# Patient Record
Sex: MALE | Race: WHITE | Employment: OTHER | URBAN - METROPOLITAN AREA
[De-identification: names, ages, dates, MRNs, and addresses within clinical notes are randomized per-mention and may not be internally consistent; named-entity substitution may affect disease eponyms.]

---

## 2019-01-21 ENCOUNTER — HOSPITAL ENCOUNTER (OUTPATIENT)
Age: 74
Discharge: HOME | End: 2019-01-23
Payer: MEDICARE

## 2019-01-21 PROBLEM — E78.2 MIXED HYPERLIPIDEMIA: Status: ACTIVE | Noted: 2019-01-21

## 2019-01-21 PROBLEM — Z79.4 TYPE 2 DIABETES MELLITUS WITH OPHTHALMIC COMPLICATION, WITH LONG-TERM CURRENT USE OF INSULIN (HCC): Status: ACTIVE | Noted: 2019-01-21

## 2019-01-21 PROBLEM — N40.0 BENIGN PROSTATIC HYPERPLASIA WITHOUT LOWER URINARY TRACT SYMPTOMS: Status: ACTIVE | Noted: 2019-01-21

## 2019-01-21 PROBLEM — I10 BENIGN ESSENTIAL HTN: Status: ACTIVE | Noted: 2019-01-21

## 2019-01-21 PROBLEM — E11.39 TYPE 2 DIABETES MELLITUS WITH OPHTHALMIC COMPLICATION, WITH LONG-TERM CURRENT USE OF INSULIN (HCC): Status: ACTIVE | Noted: 2019-01-21

## 2019-01-21 LAB
ALBUMIN SERPL-MCNC: 4.3 G/DL (ref 3.5–5.2)
ALP BLD-CCNC: 46 U/L (ref 40–129)
ALT SERPL-CCNC: 23 U/L (ref 0–40)
ANION GAP SERPL CALCULATED.3IONS-SCNC: 14 MMOL/L (ref 7–16)
AST SERPL-CCNC: 18 U/L (ref 0–39)
BASOPHILS ABSOLUTE: 0.03 E9/L (ref 0–0.2)
BASOPHILS RELATIVE PERCENT: 0.5 % (ref 0–2)
BILIRUB SERPL-MCNC: 0.6 MG/DL (ref 0–1.2)
BUN BLDV-MCNC: 19 MG/DL (ref 8–23)
CALCIUM SERPL-MCNC: 8.9 MG/DL (ref 8.6–10.2)
CHLORIDE BLD-SCNC: 100 MMOL/L (ref 98–107)
CHOLESTEROL, TOTAL: 133 MG/DL (ref 0–199)
CO2: 22 MMOL/L (ref 22–29)
CREAT SERPL-MCNC: 0.9 MG/DL (ref 0.7–1.2)
EOSINOPHILS ABSOLUTE: 0.15 E9/L (ref 0.05–0.5)
EOSINOPHILS RELATIVE PERCENT: 2.4 % (ref 0–6)
GFR AFRICAN AMERICAN: >60
GFR NON-AFRICAN AMERICAN: >60 ML/MIN/1.73
GLUCOSE BLD-MCNC: 234 MG/DL (ref 74–99)
HBA1C MFR BLD: 7.7 % (ref 4–5.6)
HCT VFR BLD CALC: 46.5 % (ref 37–54)
HDLC SERPL-MCNC: 46 MG/DL
HEMOGLOBIN: 15.1 G/DL (ref 12.5–16.5)
IMMATURE GRANULOCYTES #: 0.02 E9/L
IMMATURE GRANULOCYTES %: 0.3 % (ref 0–5)
LDL CHOLESTEROL CALCULATED: 63 MG/DL (ref 0–99)
LYMPHOCYTES ABSOLUTE: 1.78 E9/L (ref 1.5–4)
LYMPHOCYTES RELATIVE PERCENT: 29 % (ref 20–42)
MCH RBC QN AUTO: 30.6 PG (ref 26–35)
MCHC RBC AUTO-ENTMCNC: 32.5 % (ref 32–34.5)
MCV RBC AUTO: 94.1 FL (ref 80–99.9)
MONOCYTES ABSOLUTE: 0.34 E9/L (ref 0.1–0.95)
MONOCYTES RELATIVE PERCENT: 5.5 % (ref 2–12)
NEUTROPHILS ABSOLUTE: 3.82 E9/L (ref 1.8–7.3)
NEUTROPHILS RELATIVE PERCENT: 62.3 % (ref 43–80)
PDW BLD-RTO: 13.1 FL (ref 11.5–15)
PLATELET # BLD: 172 E9/L (ref 130–450)
PMV BLD AUTO: 11.3 FL (ref 7–12)
POTASSIUM SERPL-SCNC: 4.7 MMOL/L (ref 3.5–5)
RBC # BLD: 4.94 E12/L (ref 3.8–5.8)
SODIUM BLD-SCNC: 136 MMOL/L (ref 132–146)
TOTAL PROTEIN: 7.1 G/DL (ref 6.4–8.3)
TRIGL SERPL-MCNC: 119 MG/DL (ref 0–149)
VLDLC SERPL CALC-MCNC: 24 MG/DL
WBC # BLD: 6.1 E9/L (ref 4.5–11.5)

## 2019-01-21 PROCEDURE — 85025 COMPLETE CBC W/AUTO DIFF WBC: CPT

## 2019-01-21 PROCEDURE — 83036 HEMOGLOBIN GLYCOSYLATED A1C: CPT

## 2019-01-21 PROCEDURE — 82570 ASSAY OF URINE CREATININE: CPT

## 2019-01-21 PROCEDURE — 82044 UR ALBUMIN SEMIQUANTITATIVE: CPT

## 2019-01-21 PROCEDURE — 80061 LIPID PANEL: CPT

## 2019-01-21 PROCEDURE — 80053 COMPREHEN METABOLIC PANEL: CPT

## 2019-01-22 LAB
CREATININE URINE: 70 MG/DL (ref 40–278)
MICROALBUMIN UR-MCNC: 277.2 MG/L
MICROALBUMIN/CREAT UR-RTO: 396 (ref 0–30)

## 2019-09-23 ENCOUNTER — HOSPITAL ENCOUNTER (OUTPATIENT)
Age: 74
Discharge: HOME | End: 2019-09-25
Payer: MEDICARE

## 2019-09-23 DIAGNOSIS — Z79.4 TYPE 2 DIABETES MELLITUS WITH RETINOPATHY WITHOUT MACULAR EDEMA, WITH LONG-TERM CURRENT USE OF INSULIN, UNSPECIFIED LATERALITY, UNSPECIFIED RETINOPATHY SEVERITY (HCC): ICD-10-CM

## 2019-09-23 DIAGNOSIS — E11.319 TYPE 2 DIABETES MELLITUS WITH RETINOPATHY WITHOUT MACULAR EDEMA, WITH LONG-TERM CURRENT USE OF INSULIN, UNSPECIFIED LATERALITY, UNSPECIFIED RETINOPATHY SEVERITY (HCC): ICD-10-CM

## 2019-09-23 LAB
ALBUMIN SERPL-MCNC: 4.4 G/DL (ref 3.5–5.2)
ALP BLD-CCNC: 46 U/L (ref 40–129)
ALT SERPL-CCNC: 20 U/L (ref 0–40)
ANION GAP SERPL CALCULATED.3IONS-SCNC: 17 MMOL/L (ref 7–16)
AST SERPL-CCNC: 20 U/L (ref 0–39)
BASOPHILS ABSOLUTE: 0.03 E9/L (ref 0–0.2)
BASOPHILS RELATIVE PERCENT: 0.5 % (ref 0–2)
BILIRUB SERPL-MCNC: 0.9 MG/DL (ref 0–1.2)
BUN BLDV-MCNC: 22 MG/DL (ref 8–23)
CALCIUM SERPL-MCNC: 9.5 MG/DL (ref 8.6–10.2)
CHLORIDE BLD-SCNC: 104 MMOL/L (ref 98–107)
CHOLESTEROL, TOTAL: 141 MG/DL (ref 0–199)
CO2: 22 MMOL/L (ref 22–29)
CREAT SERPL-MCNC: 1 MG/DL (ref 0.7–1.2)
CREATININE URINE: 145 MG/DL (ref 40–278)
EOSINOPHILS ABSOLUTE: 0.07 E9/L (ref 0.05–0.5)
EOSINOPHILS RELATIVE PERCENT: 1.1 % (ref 0–6)
GFR AFRICAN AMERICAN: >60
GFR NON-AFRICAN AMERICAN: >60 ML/MIN/1.73
GLUCOSE BLD-MCNC: 160 MG/DL (ref 74–99)
HBA1C MFR BLD: 7.8 % (ref 4–5.6)
HCT VFR BLD CALC: 47.1 % (ref 37–54)
HDLC SERPL-MCNC: 53 MG/DL
HEMOGLOBIN: 15.1 G/DL (ref 12.5–16.5)
IMMATURE GRANULOCYTES #: 0.01 E9/L
IMMATURE GRANULOCYTES %: 0.2 % (ref 0–5)
LDL CHOLESTEROL CALCULATED: 71 MG/DL (ref 0–99)
LYMPHOCYTES ABSOLUTE: 1.9 E9/L (ref 1.5–4)
LYMPHOCYTES RELATIVE PERCENT: 28.6 % (ref 20–42)
MCH RBC QN AUTO: 31.2 PG (ref 26–35)
MCHC RBC AUTO-ENTMCNC: 32.1 % (ref 32–34.5)
MCV RBC AUTO: 97.3 FL (ref 80–99.9)
MICROALBUMIN UR-MCNC: 504 MG/L
MICROALBUMIN/CREAT UR-RTO: 347.6 (ref 0–30)
MONOCYTES ABSOLUTE: 0.37 E9/L (ref 0.1–0.95)
MONOCYTES RELATIVE PERCENT: 5.6 % (ref 2–12)
NEUTROPHILS ABSOLUTE: 4.27 E9/L (ref 1.8–7.3)
NEUTROPHILS RELATIVE PERCENT: 64 % (ref 43–80)
PDW BLD-RTO: 13.5 FL (ref 11.5–15)
PLATELET # BLD: 175 E9/L (ref 130–450)
PMV BLD AUTO: 10.6 FL (ref 7–12)
POTASSIUM SERPL-SCNC: 4.6 MMOL/L (ref 3.5–5)
RBC # BLD: 4.84 E12/L (ref 3.8–5.8)
SODIUM BLD-SCNC: 143 MMOL/L (ref 132–146)
TOTAL PROTEIN: 7.4 G/DL (ref 6.4–8.3)
TRIGL SERPL-MCNC: 86 MG/DL (ref 0–149)
VLDLC SERPL CALC-MCNC: 17 MG/DL
WBC # BLD: 6.7 E9/L (ref 4.5–11.5)

## 2019-09-23 PROCEDURE — 80061 LIPID PANEL: CPT

## 2019-09-23 PROCEDURE — 83036 HEMOGLOBIN GLYCOSYLATED A1C: CPT

## 2019-09-23 PROCEDURE — 80053 COMPREHEN METABOLIC PANEL: CPT

## 2019-09-23 PROCEDURE — 82044 UR ALBUMIN SEMIQUANTITATIVE: CPT

## 2019-09-23 PROCEDURE — 82570 ASSAY OF URINE CREATININE: CPT

## 2019-09-23 PROCEDURE — 85025 COMPLETE CBC W/AUTO DIFF WBC: CPT

## 2020-03-25 ENCOUNTER — HOSPITAL ENCOUNTER (OUTPATIENT)
Age: 75
Discharge: HOME | End: 2020-03-27
Payer: MEDICARE

## 2020-03-25 PROBLEM — N50.82 SCROTAL PAIN: Status: ACTIVE | Noted: 2020-03-25

## 2020-03-25 LAB
ALBUMIN SERPL-MCNC: 4.2 G/DL (ref 3.5–5.2)
ALP BLD-CCNC: 47 U/L (ref 40–129)
ALT SERPL-CCNC: 17 U/L (ref 0–40)
ANION GAP SERPL CALCULATED.3IONS-SCNC: 13 MMOL/L (ref 7–16)
AST SERPL-CCNC: 20 U/L (ref 0–39)
BASOPHILS ABSOLUTE: 0.04 E9/L (ref 0–0.2)
BASOPHILS RELATIVE PERCENT: 0.4 % (ref 0–2)
BILIRUB SERPL-MCNC: 0.5 MG/DL (ref 0–1.2)
BUN BLDV-MCNC: 23 MG/DL (ref 8–23)
CALCIUM SERPL-MCNC: 9.6 MG/DL (ref 8.6–10.2)
CHLORIDE BLD-SCNC: 100 MMOL/L (ref 98–107)
CHOLESTEROL, TOTAL: 137 MG/DL (ref 0–199)
CO2: 25 MMOL/L (ref 22–29)
CREAT SERPL-MCNC: 1.1 MG/DL (ref 0.7–1.2)
CREATININE URINE: 77 MG/DL (ref 40–278)
EOSINOPHILS ABSOLUTE: 0.83 E9/L (ref 0.05–0.5)
EOSINOPHILS RELATIVE PERCENT: 7.6 % (ref 0–6)
GFR AFRICAN AMERICAN: >60
GFR NON-AFRICAN AMERICAN: >60 ML/MIN/1.73
GLUCOSE BLD-MCNC: 158 MG/DL (ref 74–99)
HBA1C MFR BLD: 8.2 % (ref 4–5.6)
HCT VFR BLD CALC: 48.4 % (ref 37–54)
HDLC SERPL-MCNC: 39 MG/DL
HEMOGLOBIN: 15.2 G/DL (ref 12.5–16.5)
IMMATURE GRANULOCYTES #: 0.04 E9/L
IMMATURE GRANULOCYTES %: 0.4 % (ref 0–5)
LDL CHOLESTEROL CALCULATED: 59 MG/DL (ref 0–99)
LYMPHOCYTES ABSOLUTE: 2.26 E9/L (ref 1.5–4)
LYMPHOCYTES RELATIVE PERCENT: 20.7 % (ref 20–42)
MCH RBC QN AUTO: 30.2 PG (ref 26–35)
MCHC RBC AUTO-ENTMCNC: 31.4 % (ref 32–34.5)
MCV RBC AUTO: 96.2 FL (ref 80–99.9)
MICROALBUMIN UR-MCNC: 257.9 MG/L
MICROALBUMIN/CREAT UR-RTO: 334.9 (ref 0–30)
MONOCYTES ABSOLUTE: 0.7 E9/L (ref 0.1–0.95)
MONOCYTES RELATIVE PERCENT: 6.4 % (ref 2–12)
NEUTROPHILS ABSOLUTE: 7.04 E9/L (ref 1.8–7.3)
NEUTROPHILS RELATIVE PERCENT: 64.5 % (ref 43–80)
PDW BLD-RTO: 13.5 FL (ref 11.5–15)
PLATELET # BLD: 176 E9/L (ref 130–450)
PMV BLD AUTO: 10.7 FL (ref 7–12)
POTASSIUM SERPL-SCNC: 5.2 MMOL/L (ref 3.5–5)
RBC # BLD: 5.03 E12/L (ref 3.8–5.8)
SODIUM BLD-SCNC: 138 MMOL/L (ref 132–146)
TOTAL PROTEIN: 7.2 G/DL (ref 6.4–8.3)
TRIGL SERPL-MCNC: 194 MG/DL (ref 0–149)
VLDLC SERPL CALC-MCNC: 39 MG/DL
WBC # BLD: 10.9 E9/L (ref 4.5–11.5)

## 2020-03-25 PROCEDURE — 80053 COMPREHEN METABOLIC PANEL: CPT

## 2020-03-25 PROCEDURE — 82044 UR ALBUMIN SEMIQUANTITATIVE: CPT

## 2020-03-25 PROCEDURE — 80061 LIPID PANEL: CPT

## 2020-03-25 PROCEDURE — 85025 COMPLETE CBC W/AUTO DIFF WBC: CPT

## 2020-03-25 PROCEDURE — 83036 HEMOGLOBIN GLYCOSYLATED A1C: CPT

## 2020-03-25 PROCEDURE — G0103 PSA SCREENING: HCPCS

## 2020-03-25 PROCEDURE — 82570 ASSAY OF URINE CREATININE: CPT

## 2020-03-27 LAB — PROSTATE SPECIFIC ANTIGEN: 2.18 NG/ML (ref 0–4)

## 2020-07-08 ENCOUNTER — APPOINTMENT (OUTPATIENT)
Dept: CT IMAGING | Age: 75
End: 2020-07-08
Payer: MEDICARE

## 2020-07-08 ENCOUNTER — HOSPITAL ENCOUNTER (EMERGENCY)
Age: 75
Discharge: HOME | End: 2020-07-08
Attending: EMERGENCY MEDICINE
Payer: MEDICARE

## 2020-07-08 VITALS
DIASTOLIC BLOOD PRESSURE: 86 MMHG | HEIGHT: 71 IN | TEMPERATURE: 97.8 F | WEIGHT: 250 LBS | OXYGEN SATURATION: 98 % | RESPIRATION RATE: 18 BRPM | HEART RATE: 55 BPM | BODY MASS INDEX: 35 KG/M2 | SYSTOLIC BLOOD PRESSURE: 182 MMHG

## 2020-07-08 LAB
ALBUMIN SERPL-MCNC: 4.5 G/DL (ref 3.5–5.2)
ALP BLD-CCNC: 47 U/L (ref 40–129)
ALT SERPL-CCNC: 16 U/L (ref 0–40)
ANION GAP SERPL CALCULATED.3IONS-SCNC: 12 MMOL/L (ref 7–16)
AST SERPL-CCNC: 15 U/L (ref 0–39)
BACTERIA: ABNORMAL /HPF
BASOPHILS ABSOLUTE: 0.03 E9/L (ref 0–0.2)
BASOPHILS RELATIVE PERCENT: 0.4 % (ref 0–2)
BILIRUB SERPL-MCNC: 0.5 MG/DL (ref 0–1.2)
BILIRUBIN URINE: NEGATIVE
BLOOD, URINE: ABNORMAL
BUN BLDV-MCNC: 24 MG/DL (ref 8–23)
CALCIUM SERPL-MCNC: 9.6 MG/DL (ref 8.6–10.2)
CHLORIDE BLD-SCNC: 103 MMOL/L (ref 98–107)
CLARITY: CLEAR
CO2: 24 MMOL/L (ref 22–29)
COLOR: YELLOW
CREAT SERPL-MCNC: 1.1 MG/DL (ref 0.7–1.2)
EOSINOPHILS ABSOLUTE: 0.3 E9/L (ref 0.05–0.5)
EOSINOPHILS RELATIVE PERCENT: 4.1 % (ref 0–6)
GFR AFRICAN AMERICAN: >60
GFR NON-AFRICAN AMERICAN: >60 ML/MIN/1.73
GLUCOSE BLD-MCNC: 155 MG/DL (ref 74–99)
GLUCOSE URINE: 250 MG/DL
HCT VFR BLD CALC: 45.4 % (ref 37–54)
HEMOGLOBIN: 15.2 G/DL (ref 12.5–16.5)
IMMATURE GRANULOCYTES #: 0.05 E9/L
IMMATURE GRANULOCYTES %: 0.7 % (ref 0–5)
KETONES, URINE: NEGATIVE MG/DL
LACTIC ACID: 1.6 MMOL/L (ref 0.5–2.2)
LEUKOCYTE ESTERASE, URINE: NEGATIVE
LYMPHOCYTES ABSOLUTE: 2.13 E9/L (ref 1.5–4)
LYMPHOCYTES RELATIVE PERCENT: 28.9 % (ref 20–42)
MAGNESIUM: 2 MG/DL (ref 1.6–2.6)
MCH RBC QN AUTO: 31.1 PG (ref 26–35)
MCHC RBC AUTO-ENTMCNC: 33.5 % (ref 32–34.5)
MCV RBC AUTO: 92.8 FL (ref 80–99.9)
MONOCYTES ABSOLUTE: 0.51 E9/L (ref 0.1–0.95)
MONOCYTES RELATIVE PERCENT: 6.9 % (ref 2–12)
NEUTROPHILS ABSOLUTE: 4.35 E9/L (ref 1.8–7.3)
NEUTROPHILS RELATIVE PERCENT: 59 % (ref 43–80)
NITRITE, URINE: NEGATIVE
PDW BLD-RTO: 13 FL (ref 11.5–15)
PH UA: 5.5 (ref 5–9)
PLATELET # BLD: 163 E9/L (ref 130–450)
PMV BLD AUTO: 10.4 FL (ref 7–12)
POTASSIUM SERPL-SCNC: 4.4 MMOL/L (ref 3.5–5)
PROTEIN UA: 100 MG/DL
RBC # BLD: 4.89 E12/L (ref 3.8–5.8)
RBC UA: ABNORMAL /HPF (ref 0–2)
SODIUM BLD-SCNC: 139 MMOL/L (ref 132–146)
SPECIFIC GRAVITY UA: >=1.03 (ref 1–1.03)
TOTAL CK: 164 U/L (ref 20–200)
TOTAL PROTEIN: 7.2 G/DL (ref 6.4–8.3)
TROPONIN: <0.01 NG/ML (ref 0–0.03)
UROBILINOGEN, URINE: 0.2 E.U./DL
WBC # BLD: 7.4 E9/L (ref 4.5–11.5)
WBC UA: ABNORMAL /HPF (ref 0–5)

## 2020-07-08 PROCEDURE — 71045 X-RAY EXAM CHEST 1 VIEW: CPT

## 2020-07-08 PROCEDURE — 85025 COMPLETE CBC W/AUTO DIFF WBC: CPT

## 2020-07-08 PROCEDURE — 84484 ASSAY OF TROPONIN QUANT: CPT

## 2020-07-08 PROCEDURE — 83735 ASSAY OF MAGNESIUM: CPT

## 2020-07-08 PROCEDURE — 80053 COMPREHEN METABOLIC PANEL: CPT

## 2020-07-08 PROCEDURE — 70450 CT HEAD/BRAIN W/O DYE: CPT

## 2020-07-08 PROCEDURE — 81001 URINALYSIS AUTO W/SCOPE: CPT

## 2020-07-08 PROCEDURE — 82550 ASSAY OF CK (CPK): CPT

## 2020-07-08 PROCEDURE — 99285 EMERGENCY DEPT VISIT HI MDM: CPT

## 2020-07-08 PROCEDURE — 36415 COLL VENOUS BLD VENIPUNCTURE: CPT

## 2020-07-08 PROCEDURE — 83605 ASSAY OF LACTIC ACID: CPT

## 2020-07-08 PROCEDURE — 93005 ELECTROCARDIOGRAM TRACING: CPT | Performed by: EMERGENCY MEDICINE

## 2020-07-08 RX ORDER — TADALAFIL 20 MG/1
20 TABLET ORAL EVERY OTHER DAY
COMMUNITY
End: 2021-10-27

## 2020-07-08 RX ORDER — NYSTATIN 100000 U/G
CREAM TOPICAL 2 TIMES DAILY
COMMUNITY
End: 2021-04-30 | Stop reason: SDUPTHER

## 2020-07-08 ASSESSMENT — VISUAL ACUITY
OS: 20/50
OD: 20/30
OU: 20/40

## 2020-07-08 NOTE — ED PROVIDER NOTES
HPI:  7/8/20, Time: 6:38 PM EDT         Jackie Morfin is a 75 y.o. male presenting to the ED for binocular diplopia, beginning 1 week ago. States it goes away if covers either eye. He denies head injury, headache, nausea, emesis, slurred speech, color vision change, focal paresthesias, focal weakness, difficulty breathing or swallowing. States the double vision is [id number]. Called his ophthalmologist and made an appointment but it is not until next week and was advised to come to the ER. The complaint has been constant, moderate in severity, and worsened by nothing. Denies medication changes. Patient denies fever/chills, cough, congestion, chest pain, shortness of breath, edema, headache, focal paresthesias, focal weakness, abdominal pain, nausea, vomiting, diarrhea, constipation, dysuria, hematuria, trauma, neck or back pain or other complaints. ROS:   Pertinent positives and negatives are stated within HPI, all other systems reviewed and are negative.      --------------------------------------------- PAST HISTORY ---------------------------------------------  Past Medical History:  has a past medical history of Diabetes mellitus ([facility] [address] Dizziness, Dizziness, Hypertension, Hypertension, Inner ear dysfunction, Prostate enlargement, and Syncope, near. Past Surgical History:  has a past surgical history that includes Tonsillectomy; eye surgery; and Colonoscopy (08/06/2010). Social History:  reports that he quit smoking about 5 years ago. His smoking use included pipe. He started smoking about 36 years ago. He has a 30.00 pack-year smoking history. He has never used smokeless tobacco. He reports that he does not drink alcohol or use drugs. Family History: family history includes Cancer in his brother; High Blood Pressure in his father. The patients home medications have been reviewed.     Allergies: Patient has no known allergies. ---------------------------------------------------PHYSICAL EXAM--------------------------------------    Constitutional:  Well developed, well nourished, no acute distress, non-toxic appearance   Eyes:  PERRLA, conjunctiva normal, left EOMI, right eye does not track laterally past midline with eliciting his dipolopia, no nystagmus. Prefers to tilt head when looking at you and likes to closed his left eye. HENT:  Atraumatic, external ears normal, nose normal, oropharynx moist. Neck- normal range of motion, no nuchal rigidity  Respiratory:  No respiratory distress, normal breath sounds, no rales, no wheezing   Cardiovascular:  Normal rate, normal rhythm, no murmurs, no gallops, no rubs. Radial and DP pulses 2+ bilaterally. GI:  Soft, nondistended, normal bowel sounds, nontender, no organomegaly, no mass, no rebound, no guarding   :  No costovertebral angle tenderness   Musculoskeletal:  2+ bilateral lower extremity edema with venous stasis, no tenderness, no deformities. Back- no tenderness  Integument:  Well hydrated, no rash. Adequate perfusion. Lymphatic:  No cervical lymphadenopathy noted   Neurologic:  Alert & oriented x 3, CN 2-12 normal, normal motor function, normal sensory function, no focal deficits noted. No slurred speech. Diplopia resolved with covering a single eye. Psychiatric:  Speech and behavior appropriate     NIH Stroke Scale/Score at time of initial evaluation:  1A: Level of Consciousness 0 - alert; keenly responsive   1B: Ask Month and Age 0 - answers both questions correctly   1C:  Tell Patient To Open and Close Eyes, then Hand  Squeeze 0 - performs both tasks correctly   2: Test Horizontal Extraocular Movements 0 - normal   3: Test Visual Fields 0 - no visual loss   4: Test Facial Palsy 0 - normal symmetric movement   5A: Test Left Arm Motor Drift 0 - no drift, limb holds 90 (or 45) degrees for full 10 seconds   5B: Test Right Arm Motor Drift 0 - no drift, limb Neutrophils % 59.0 43.0 - 80.0 %    Immature Granulocytes % 0.7 0.0 - 5.0 %    Lymphocytes % 28.9 20.0 - 42.0 %    Monocytes % 6.9 2.0 - 12.0 %    Eosinophils % 4.1 0.0 - 6.0 %    Basophils % 0.4 0.0 - 2.0 %    Neutrophils Absolute 4.35 1.80 - 7.30 E9/L    Immature Granulocytes # 0.05 E9/L    Lymphocytes Absolute 2.13 1.50 - 4.00 E9/L    Monocytes Absolute 0.51 0.10 - 0.95 E9/L    Eosinophils Absolute 0.30 0.05 - 0.50 E9/L    Basophils Absolute 0.03 0.00 - 0.20 E9/L   Comprehensive Metabolic Panel   Result Value Ref Range    Sodium 139 132 - 146 mmol/L    Potassium 4.4 3.5 - 5.0 mmol/L    Chloride 103 98 - 107 mmol/L    CO2 24 22 - 29 mmol/L    Anion Gap 12 7 - 16 mmol/L    Glucose 155 (H) 74 - 99 mg/dL    BUN 24 (H) 8 - 23 mg/dL    CREATININE 1.1 0.7 - 1.2 mg/dL    GFR Non-African American >60 >=60 mL/min/1.73    GFR African American >60     Calcium 9.6 8.6 - 10.2 mg/dL    Total Protein 7.2 6.4 - 8.3 g/dL    Alb 4.5 3.5 - 5.2 g/dL    Total Bilirubin 0.5 0.0 - 1.2 mg/dL    Alkaline Phosphatase 47 40 - 129 U/L    ALT 16 0 - 40 U/L    AST 15 0 - 39 U/L   Troponin   Result Value Ref Range    Troponin <0.01 0.00 - 0.03 ng/mL   Magnesium   Result Value Ref Range    Magnesium 2.0 1.6 - 2.6 mg/dL   CK   Result Value Ref Range    Total  20 - 200 U/L   Lactic Acid, Plasma   Result Value Ref Range    Lactic Acid 1.6 0.5 - 2.2 mmol/L   EKG 12 Lead   Result Value Ref Range    Ventricular Rate 58 BPM    Atrial Rate 58 BPM    P-R Interval 120 ms    QRS Duration 102 ms    Q-T Interval 428 ms    QTc Calculation (Bazett) 420 ms    P Axis -22 degrees    R Axis -46 degrees    T Axis 99 degrees       RADIOLOGY:  Interpreted by Radiologist.  CT Head WO Contrast   Final Result      NO ACUTE INTRACRANIAL PROCESS         XR CHEST PORTABLE   Final Result      Cardiomegaly      No evidence of airspace consolidation or pulmonary venous congestion.                EKG Interpretation  Interpreted by emergency department physicianNevaeh Time: 1829  Rhythm: sinus bradycardia  Rate: 58  Axis: normal  Conduction: right bundle branch block (incomplete)  ST Segments: no acute change  T Waves: non specific changes  Clinical Impression: non-specific EKG  Comparison to prior EKG: stable as compared to patient's most recent EKG      ------------------------- NURSING NOTES AND VITALS REVIEWED ---------------------------   The nursing notes within the ED encounter and vital signs as below have been reviewed by myself. BP (!) 182/86   Pulse 55   Temp 97.8 °F (36.6 °C) (Temporal)   Resp 18   Ht 5' 11\" (1.803 m)   Wt 250 lb (113.4 kg)   SpO2 98%   BMI 34.87 kg/m²   Oxygen Saturation Interpretation: Normal    The patients available past medical records and past encounters were reviewed. 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 Patient was instructed to return to the ER for any new or worsening symptoms. Additional discharge instructions were given verbally. All questions were answered. Patient is comfortable and agreeable with discharge plan. Patient in no acute distress and non-toxic in appearance. Driving restrictions reviewed   Eye patch provided   Return if worse or develops new symtpoms   Follow-up with PCP on Friday for outpatient MRI as this is very likely due to his diabetes could still be caused by stroke or small brain lesion not evident on today's CAT scan. They understand and agree with the plan.         This patient's ED course included: re-evaluation prior to disposition, cardiac monitoring, continuous pulse oximetry and a personal history and physicial eaxmination    This patient has remained hemodynamically stable and remained unchanged during their ED course. Re-Evaluations:             Time: 8:04 PM EDT  Re-evaluation. Patients symptoms show no change  Repeat physical examination is not changed        Consultations:             8:03 PM EDT  Spoke with Dr Jackelyn Antonio, likely from diabetes. Can manage outpatient. Patch right eye. Spoke with Dr Avelina Ferreira, discussed case, will follow up with patient for MRI Friday. Critical Care: none        Counseling: The emergency provider has spoken with the patient and spouse/SO and discussed todays results, in addition to providing specific details for the plan of care and counseling regarding the diagnosis and prognosis. Questions are answered at this time and they are agreeable with the plan.       --------------------------------- IMPRESSION AND DISPOSITION ---------------------------------    IMPRESSION  1. Right oculomotor nerve palsy    2. Diplopia    3.  Type 2 diabetes mellitus with hyperglycemia, with long-term current use of [facility]        DISPOSITION  Disposition: Discharge to home  Patient condition is stable                  Carl Prakash DO  07/08/20 2053

## 2020-07-09 LAB
EKG ATRIAL RATE: 58 BPM
EKG P AXIS: -22 DEGREES
EKG P-R INTERVAL: 120 MS
EKG Q-T INTERVAL: 428 MS
EKG QRS DURATION: 102 MS
EKG QTC CALCULATION (BAZETT): 420 MS
EKG R AXIS: -46 DEGREES
EKG T AXIS: 99 DEGREES
EKG VENTRICULAR RATE: 58 BPM

## 2020-07-09 PROCEDURE — 93010 ELECTROCARDIOGRAM REPORT: CPT | Performed by: INTERNAL MEDICINE

## 2020-09-25 ENCOUNTER — HOSPITAL ENCOUNTER (OUTPATIENT)
Age: 75
Discharge: HOME | End: 2020-09-27
Payer: MEDICARE

## 2020-09-25 PROBLEM — I10 BENIGN ESSENTIAL HTN: Status: RESOLVED | Noted: 2019-01-21 | Resolved: 2020-09-25

## 2020-09-25 PROBLEM — N50.82 SCROTAL PAIN: Status: RESOLVED | Noted: 2020-03-25 | Resolved: 2020-09-25

## 2020-09-25 LAB
ALBUMIN SERPL-MCNC: 4.3 G/DL (ref 3.5–5.2)
ALP BLD-CCNC: 47 U/L (ref 40–129)
ALT SERPL-CCNC: 17 U/L (ref 0–40)
ANION GAP SERPL CALCULATED.3IONS-SCNC: 15 MMOL/L (ref 7–16)
AST SERPL-CCNC: 16 U/L (ref 0–39)
BASOPHILS ABSOLUTE: 0.03 E9/L (ref 0–0.2)
BASOPHILS RELATIVE PERCENT: 0.4 % (ref 0–2)
BILIRUB SERPL-MCNC: 0.7 MG/DL (ref 0–1.2)
BUN BLDV-MCNC: 20 MG/DL (ref 8–23)
CALCIUM SERPL-MCNC: 9.4 MG/DL (ref 8.6–10.2)
CHLORIDE BLD-SCNC: 101 MMOL/L (ref 98–107)
CHOLESTEROL, TOTAL: 128 MG/DL (ref 0–199)
CO2: 24 MMOL/L (ref 22–29)
CREAT SERPL-MCNC: 1 MG/DL (ref 0.7–1.2)
CREATININE URINE: 58 MG/DL (ref 40–278)
EOSINOPHILS ABSOLUTE: 0.18 E9/L (ref 0.05–0.5)
EOSINOPHILS RELATIVE PERCENT: 2.6 % (ref 0–6)
GFR AFRICAN AMERICAN: >60
GFR NON-AFRICAN AMERICAN: >60 ML/MIN/1.73
GLUCOSE BLD-MCNC: 213 MG/DL (ref 74–99)
HBA1C MFR BLD: 8.3 % (ref 4–5.6)
HCT VFR BLD CALC: 46.6 % (ref 37–54)
HDLC SERPL-MCNC: 43 MG/DL
HEMOGLOBIN: 15.1 G/DL (ref 12.5–16.5)
IMMATURE GRANULOCYTES #: 0.02 E9/L
IMMATURE GRANULOCYTES %: 0.3 % (ref 0–5)
LDL CHOLESTEROL CALCULATED: 55 MG/DL (ref 0–99)
LYMPHOCYTES ABSOLUTE: 1.75 E9/L (ref 1.5–4)
LYMPHOCYTES RELATIVE PERCENT: 24.9 % (ref 20–42)
MCH RBC QN AUTO: 30.5 PG (ref 26–35)
MCHC RBC AUTO-ENTMCNC: 32.4 % (ref 32–34.5)
MCV RBC AUTO: 94.1 FL (ref 80–99.9)
MICROALBUMIN UR-MCNC: 382.4 MG/L
MICROALBUMIN/CREAT UR-RTO: 659.3 (ref 0–30)
MONOCYTES ABSOLUTE: 0.36 E9/L (ref 0.1–0.95)
MONOCYTES RELATIVE PERCENT: 5.1 % (ref 2–12)
NEUTROPHILS ABSOLUTE: 4.69 E9/L (ref 1.8–7.3)
NEUTROPHILS RELATIVE PERCENT: 66.7 % (ref 43–80)
PDW BLD-RTO: 13 FL (ref 11.5–15)
PLATELET # BLD: 173 E9/L (ref 130–450)
PMV BLD AUTO: 11 FL (ref 7–12)
POTASSIUM SERPL-SCNC: 4.7 MMOL/L (ref 3.5–5)
RBC # BLD: 4.95 E12/L (ref 3.8–5.8)
SODIUM BLD-SCNC: 140 MMOL/L (ref 132–146)
TOTAL PROTEIN: 7.2 G/DL (ref 6.4–8.3)
TRIGL SERPL-MCNC: 149 MG/DL (ref 0–149)
VLDLC SERPL CALC-MCNC: 30 MG/DL
WBC # BLD: 7 E9/L (ref 4.5–11.5)

## 2020-09-25 PROCEDURE — 80053 COMPREHEN METABOLIC PANEL: CPT

## 2020-09-25 PROCEDURE — 80061 LIPID PANEL: CPT

## 2020-09-25 PROCEDURE — 82570 ASSAY OF URINE CREATININE: CPT

## 2020-09-25 PROCEDURE — 85025 COMPLETE CBC W/AUTO DIFF WBC: CPT

## 2020-09-25 PROCEDURE — 83036 HEMOGLOBIN GLYCOSYLATED A1C: CPT

## 2020-09-25 PROCEDURE — 82044 UR ALBUMIN SEMIQUANTITATIVE: CPT

## 2021-10-27 DIAGNOSIS — Z79.4 TYPE 2 DIABETES MELLITUS WITH RETINOPATHY WITHOUT MACULAR EDEMA, WITH LONG-TERM CURRENT USE OF INSULIN, UNSPECIFIED LATERALITY, UNSPECIFIED RETINOPATHY SEVERITY (HCC): ICD-10-CM

## 2021-10-27 DIAGNOSIS — E11.319 TYPE 2 DIABETES MELLITUS WITH RETINOPATHY WITHOUT MACULAR EDEMA, WITH LONG-TERM CURRENT USE OF INSULIN, UNSPECIFIED LATERALITY, UNSPECIFIED RETINOPATHY SEVERITY (HCC): ICD-10-CM

## 2021-10-27 LAB
ALBUMIN SERPL-MCNC: 4.5 G/DL (ref 3.5–5.2)
ALP BLD-CCNC: 57 U/L (ref 40–129)
ALT SERPL-CCNC: 22 U/L (ref 0–40)
ANION GAP SERPL CALCULATED.3IONS-SCNC: 13 MMOL/L (ref 7–16)
AST SERPL-CCNC: 23 U/L (ref 0–39)
BACTERIA: NORMAL /HPF
BASOPHILS ABSOLUTE: 0.03 E9/L (ref 0–0.2)
BASOPHILS RELATIVE PERCENT: 0.4 % (ref 0–2)
BILIRUB SERPL-MCNC: 0.4 MG/DL (ref 0–1.2)
BILIRUBIN URINE: NEGATIVE
BLOOD, URINE: NEGATIVE
BUN BLDV-MCNC: 18 MG/DL (ref 6–23)
CALCIUM SERPL-MCNC: 9.5 MG/DL (ref 8.6–10.2)
CHLORIDE BLD-SCNC: 101 MMOL/L (ref 98–107)
CHOLESTEROL, TOTAL: 148 MG/DL (ref 0–199)
CLARITY: CLEAR
CO2: 22 MMOL/L (ref 22–29)
COLOR: YELLOW
CREAT SERPL-MCNC: 1.1 MG/DL (ref 0.7–1.2)
EOSINOPHILS ABSOLUTE: 0.21 E9/L (ref 0.05–0.5)
EOSINOPHILS RELATIVE PERCENT: 2.8 % (ref 0–6)
GFR AFRICAN AMERICAN: >60
GFR NON-AFRICAN AMERICAN: >60 ML/MIN/1.73
GLUCOSE BLD-MCNC: 197 MG/DL (ref 74–99)
GLUCOSE URINE: 100 MG/DL
HBA1C MFR BLD: 8.4 % (ref 4–5.6)
HCT VFR BLD CALC: 46.3 % (ref 37–54)
HDLC SERPL-MCNC: 51 MG/DL
HEMOGLOBIN: 15.2 G/DL (ref 12.5–16.5)
IMMATURE GRANULOCYTES #: 0.02 E9/L
IMMATURE GRANULOCYTES %: 0.3 % (ref 0–5)
KETONES, URINE: NEGATIVE MG/DL
LDL CHOLESTEROL CALCULATED: 72 MG/DL (ref 0–99)
LEUKOCYTE ESTERASE, URINE: NEGATIVE
LYMPHOCYTES ABSOLUTE: 1.86 E9/L (ref 1.5–4)
LYMPHOCYTES RELATIVE PERCENT: 25.1 % (ref 20–42)
MCH RBC QN AUTO: 30.4 PG (ref 26–35)
MCHC RBC AUTO-ENTMCNC: 32.8 % (ref 32–34.5)
MCV RBC AUTO: 92.6 FL (ref 80–99.9)
MICROALBUMIN UR-MCNC: 1066.3 MG/L
MONOCYTES ABSOLUTE: 0.43 E9/L (ref 0.1–0.95)
MONOCYTES RELATIVE PERCENT: 5.8 % (ref 2–12)
NEUTROPHILS ABSOLUTE: 4.85 E9/L (ref 1.8–7.3)
NEUTROPHILS RELATIVE PERCENT: 65.6 % (ref 43–80)
NITRITE, URINE: NEGATIVE
PDW BLD-RTO: 13.3 FL (ref 11.5–15)
PH UA: 5.5 (ref 5–9)
PLATELET # BLD: 185 E9/L (ref 130–450)
PMV BLD AUTO: 11.1 FL (ref 7–12)
POTASSIUM SERPL-SCNC: 4.8 MMOL/L (ref 3.5–5)
PROTEIN UA: 100 MG/DL
RBC # BLD: 5 E12/L (ref 3.8–5.8)
RBC UA: NORMAL /HPF (ref 0–2)
SODIUM BLD-SCNC: 136 MMOL/L (ref 132–146)
SPECIFIC GRAVITY UA: 1.02 (ref 1–1.03)
TOTAL PROTEIN: 6.4 G/DL (ref 6.4–8.3)
TRIGL SERPL-MCNC: 123 MG/DL (ref 0–149)
UROBILINOGEN, URINE: 0.2 E.U./DL
VLDLC SERPL CALC-MCNC: 25 MG/DL
WBC # BLD: 7.4 E9/L (ref 4.5–11.5)
WBC UA: NORMAL /HPF (ref 0–5)

## 2022-02-22 ENCOUNTER — APPOINTMENT (OUTPATIENT)
Dept: ULTRASOUND IMAGING | Age: 77
DRG: 603 | End: 2022-02-22
Payer: MEDICARE

## 2022-02-22 ENCOUNTER — APPOINTMENT (OUTPATIENT)
Dept: GENERAL RADIOLOGY | Age: 77
DRG: 603 | End: 2022-02-22
Payer: MEDICARE

## 2022-02-22 ENCOUNTER — HOSPITAL ENCOUNTER (INPATIENT)
Age: 77
LOS: 7 days | Discharge: HOME | DRG: 603 | End: 2022-03-01
Attending: EMERGENCY MEDICINE | Admitting: INTERNAL MEDICINE
Payer: MEDICARE

## 2022-02-22 DIAGNOSIS — L03.116 LEFT LEG CELLULITIS: Primary | ICD-10-CM

## 2022-02-22 DIAGNOSIS — R73.9 HYPERGLYCEMIA: ICD-10-CM

## 2022-02-22 LAB
ALBUMIN SERPL-MCNC: 3.8 G/DL (ref 3.5–5.2)
ALP BLD-CCNC: 121 U/L (ref 40–129)
ALT SERPL-CCNC: 30 U/L (ref 0–40)
ANION GAP SERPL CALCULATED.3IONS-SCNC: 11 MMOL/L (ref 7–16)
AST SERPL-CCNC: 22 U/L (ref 0–39)
BASOPHILS ABSOLUTE: 0.04 E9/L (ref 0–0.2)
BASOPHILS RELATIVE PERCENT: 0.3 % (ref 0–2)
BILIRUB SERPL-MCNC: 0.4 MG/DL (ref 0–1.2)
BUN BLDV-MCNC: 23 MG/DL (ref 6–23)
CALCIUM SERPL-MCNC: 9.2 MG/DL (ref 8.6–10.2)
CHLORIDE BLD-SCNC: 97 MMOL/L (ref 98–107)
CO2: 26 MMOL/L (ref 22–29)
CREAT SERPL-MCNC: 1.1 MG/DL (ref 0.7–1.2)
EOSINOPHILS ABSOLUTE: 0.01 E9/L (ref 0.05–0.5)
EOSINOPHILS RELATIVE PERCENT: 0.1 % (ref 0–6)
GFR AFRICAN AMERICAN: >60
GFR NON-AFRICAN AMERICAN: >60 ML/MIN/1.73
GLUCOSE BLD-MCNC: 373 MG/DL (ref 74–99)
HCT VFR BLD CALC: 41.6 % (ref 37–54)
HEMOGLOBIN: 13.5 G/DL (ref 12.5–16.5)
IMMATURE GRANULOCYTES #: 0.07 E9/L
IMMATURE GRANULOCYTES %: 0.5 % (ref 0–5)
LACTIC ACID: 1.3 MMOL/L (ref 0.5–2.2)
LYMPHOCYTES ABSOLUTE: 1.53 E9/L (ref 1.5–4)
LYMPHOCYTES RELATIVE PERCENT: 10 % (ref 20–42)
MCH RBC QN AUTO: 30.3 PG (ref 26–35)
MCHC RBC AUTO-ENTMCNC: 32.5 % (ref 32–34.5)
MCV RBC AUTO: 93.3 FL (ref 80–99.9)
MONOCYTES ABSOLUTE: 1.13 E9/L (ref 0.1–0.95)
MONOCYTES RELATIVE PERCENT: 7.4 % (ref 2–12)
NEUTROPHILS ABSOLUTE: 12.46 E9/L (ref 1.8–7.3)
NEUTROPHILS RELATIVE PERCENT: 81.7 % (ref 43–80)
PDW BLD-RTO: 12.3 FL (ref 11.5–15)
PLATELET # BLD: 277 E9/L (ref 130–450)
PMV BLD AUTO: 9.8 FL (ref 7–12)
POTASSIUM SERPL-SCNC: 4.9 MMOL/L (ref 3.5–5)
PRO-BNP: 665 PG/ML (ref 0–450)
RBC # BLD: 4.46 E12/L (ref 3.8–5.8)
SODIUM BLD-SCNC: 134 MMOL/L (ref 132–146)
TOTAL PROTEIN: 7.9 G/DL (ref 6.4–8.3)
TROPONIN, HIGH SENSITIVITY: 35 NG/L (ref 0–11)
WBC # BLD: 15.2 E9/L (ref 4.5–11.5)

## 2022-02-22 PROCEDURE — 6370000000 HC RX 637 (ALT 250 FOR IP): Performed by: NURSE PRACTITIONER

## 2022-02-22 PROCEDURE — 83605 ASSAY OF LACTIC ACID: CPT

## 2022-02-22 PROCEDURE — 36415 COLL VENOUS BLD VENIPUNCTURE: CPT

## 2022-02-22 PROCEDURE — 2580000003 HC RX 258: Performed by: EMERGENCY MEDICINE

## 2022-02-22 PROCEDURE — 84484 ASSAY OF TROPONIN QUANT: CPT

## 2022-02-22 PROCEDURE — 2500000003 HC RX 250 WO HCPCS: Performed by: EMERGENCY MEDICINE

## 2022-02-22 PROCEDURE — 6370000000 HC RX 637 (ALT 250 FOR IP): Performed by: EMERGENCY MEDICINE

## 2022-02-22 PROCEDURE — 71045 X-RAY EXAM CHEST 1 VIEW: CPT

## 2022-02-22 PROCEDURE — 2140000000 HC CCU INTERMEDIATE R&B

## 2022-02-22 PROCEDURE — 6360000002 HC RX W HCPCS: Performed by: EMERGENCY MEDICINE

## 2022-02-22 PROCEDURE — 87150 DNA/RNA AMPLIFIED PROBE: CPT

## 2022-02-22 PROCEDURE — 83880 ASSAY OF NATRIURETIC PEPTIDE: CPT

## 2022-02-22 PROCEDURE — 87077 CULTURE AEROBIC IDENTIFY: CPT

## 2022-02-22 PROCEDURE — 85025 COMPLETE CBC W/AUTO DIFF WBC: CPT

## 2022-02-22 PROCEDURE — 87040 BLOOD CULTURE FOR BACTERIA: CPT

## 2022-02-22 PROCEDURE — 80053 COMPREHEN METABOLIC PANEL: CPT

## 2022-02-22 PROCEDURE — 93971 EXTREMITY STUDY: CPT | Performed by: RADIOLOGY

## 2022-02-22 PROCEDURE — 93971 EXTREMITY STUDY: CPT

## 2022-02-22 PROCEDURE — 99284 EMERGENCY DEPT VISIT MOD MDM: CPT

## 2022-02-22 PROCEDURE — 93005 ELECTROCARDIOGRAM TRACING: CPT | Performed by: NURSE PRACTITIONER

## 2022-02-22 PROCEDURE — 87186 SC STD MICRODIL/AGAR DIL: CPT

## 2022-02-22 PROCEDURE — 96374 THER/PROPH/DIAG INJ IV PUSH: CPT

## 2022-02-22 RX ORDER — HYDROCODONE BITARTRATE AND ACETAMINOPHEN 5; 325 MG/1; MG/1
1 TABLET ORAL ONCE
Status: COMPLETED | OUTPATIENT
Start: 2022-02-22 | End: 2022-02-22

## 2022-02-22 RX ORDER — HYDRALAZINE HYDROCHLORIDE 20 MG/ML
5 INJECTION INTRAMUSCULAR; INTRAVENOUS ONCE
Status: COMPLETED | OUTPATIENT
Start: 2022-02-22 | End: 2022-02-22

## 2022-02-22 RX ORDER — CLINDAMYCIN PHOSPHATE 600 MG/50ML
600 INJECTION INTRAVENOUS ONCE
Status: COMPLETED | OUTPATIENT
Start: 2022-02-22 | End: 2022-02-22

## 2022-02-22 RX ORDER — AMLODIPINE BESYLATE 5 MG/1
5 TABLET ORAL ONCE
Status: COMPLETED | OUTPATIENT
Start: 2022-02-22 | End: 2022-02-22

## 2022-02-22 RX ADMIN — CLINDAMYCIN PHOSPHATE 600 MG: 600 INJECTION, SOLUTION INTRAVENOUS at 22:03

## 2022-02-22 RX ADMIN — CEFAZOLIN SODIUM 1000 MG: 1 INJECTION, POWDER, FOR SOLUTION INTRAMUSCULAR; INTRAVENOUS at 22:43

## 2022-02-22 RX ADMIN — HYDROCODONE BITARTRATE AND ACETAMINOPHEN 1 TABLET: 5; 325 TABLET ORAL at 18:40

## 2022-02-22 RX ADMIN — HYDRALAZINE HYDROCHLORIDE 5 MG: 20 INJECTION INTRAMUSCULAR; INTRAVENOUS at 22:25

## 2022-02-22 RX ADMIN — AMLODIPINE BESYLATE 5 MG: 5 TABLET ORAL at 18:39

## 2022-02-22 RX ADMIN — Medication 4 UNITS: at 22:19

## 2022-02-22 ASSESSMENT — PAIN DESCRIPTION - ORIENTATION: ORIENTATION: LEFT

## 2022-02-22 ASSESSMENT — PAIN DESCRIPTION - DESCRIPTORS: DESCRIPTORS: SORE;TENDER;BURNING

## 2022-02-22 ASSESSMENT — PAIN SCALES - GENERAL: PAINLEVEL_OUTOF10: 10

## 2022-02-22 ASSESSMENT — PAIN DESCRIPTION - LOCATION: LOCATION: LEG

## 2022-02-22 ASSESSMENT — PAIN - FUNCTIONAL ASSESSMENT: PAIN_FUNCTIONAL_ASSESSMENT: 0-10

## 2022-02-22 ASSESSMENT — PAIN DESCRIPTION - PAIN TYPE: TYPE: ACUTE PAIN

## 2022-02-22 NOTE — ED NOTES
Department of Emergency Medicine  FIRST PROVIDER TRIAGE NOTE             Independent MLP           2/22/22  6:37 PM EST    Date of Encounter: 2/22/22   MRN: [id number]      HPI: Josefina Ken is a 67 y.o. male who presents to the ED for Leg Swelling (pt here for swelling and redness to left leg since this morning. pain and warm to touch)  in with 3 day history of Leg pain, swelling and redness. Also wife reports sh had patient hold his BP med bc she thought that was causing his swelling    ROS: Negative for cp or sob. PE: Gen Appearance/Constitutional: alert  Lymphatics: peripheral edema present-   LLE 2 + edema with erythema      Initial Plan of Care: All treatment areas with department are currently occupied. Plan to order/Initiate the following while awaiting opening in ED: labs, EKG, imaging studies and ultrasound.   Initiate Treatment-Testing, Proceed toTreatment Area When Bed Available for ED Attending/MLP to Continue Care    Electronically signed by ABBEY Nicolas CNP   DD: 2/22/22         ABBEY Nicolas CNP  02/22/22 1605

## 2022-02-23 LAB
EKG ATRIAL RATE: 65 BPM
EKG P AXIS: 19 DEGREES
EKG P-R INTERVAL: 176 MS
EKG Q-T INTERVAL: 406 MS
EKG QRS DURATION: 104 MS
EKG QTC CALCULATION (BAZETT): 422 MS
EKG R AXIS: -33 DEGREES
EKG T AXIS: 88 DEGREES
EKG VENTRICULAR RATE: 65 BPM
GLUCOSE BLD-MCNC: 463 MG/DL (ref 74–99)
METER GLUCOSE: 217 MG/DL (ref 74–99)
METER GLUCOSE: 348 MG/DL (ref 74–99)
METER GLUCOSE: 389 MG/DL (ref 74–99)
METER GLUCOSE: 445 MG/DL (ref 74–99)
METER GLUCOSE: >500 MG/DL (ref 74–99)

## 2022-02-23 PROCEDURE — 97530 THERAPEUTIC ACTIVITIES: CPT

## 2022-02-23 PROCEDURE — 2500000003 HC RX 250 WO HCPCS: Performed by: INTERNAL MEDICINE

## 2022-02-23 PROCEDURE — 82947 ASSAY GLUCOSE BLOOD QUANT: CPT

## 2022-02-23 PROCEDURE — 97161 PT EVAL LOW COMPLEX 20 MIN: CPT

## 2022-02-23 PROCEDURE — 93010 ELECTROCARDIOGRAM REPORT: CPT | Performed by: INTERNAL MEDICINE

## 2022-02-23 PROCEDURE — 82962 GLUCOSE BLOOD TEST: CPT

## 2022-02-23 PROCEDURE — 2140000000 HC CCU INTERMEDIATE R&B

## 2022-02-23 PROCEDURE — 6360000002 HC RX W HCPCS: Performed by: INTERNAL MEDICINE

## 2022-02-23 PROCEDURE — 2580000003 HC RX 258: Performed by: INTERNAL MEDICINE

## 2022-02-23 PROCEDURE — 36415 COLL VENOUS BLD VENIPUNCTURE: CPT

## 2022-02-23 PROCEDURE — S5553 INSULIN LONG ACTING 5 U: HCPCS | Performed by: INTERNAL MEDICINE

## 2022-02-23 PROCEDURE — 6370000000 HC RX 637 (ALT 250 FOR IP): Performed by: INTERNAL MEDICINE

## 2022-02-23 RX ORDER — DEXTROSE MONOHYDRATE 50 MG/ML
100 INJECTION, SOLUTION INTRAVENOUS PRN
Status: DISCONTINUED | OUTPATIENT
Start: 2022-02-23 | End: 2022-03-01 | Stop reason: HOSPADM

## 2022-02-23 RX ORDER — FLUCONAZOLE 100 MG/1
200 TABLET ORAL DAILY
Status: DISCONTINUED | OUTPATIENT
Start: 2022-02-23 | End: 2022-02-26

## 2022-02-23 RX ORDER — INSULIN GLARGINE-YFGN 100 [IU]/ML
50 INJECTION, SOLUTION SUBCUTANEOUS DAILY
Status: DISCONTINUED | OUTPATIENT
Start: 2022-02-23 | End: 2022-03-01 | Stop reason: HOSPADM

## 2022-02-23 RX ORDER — AMLODIPINE BESYLATE 5 MG/1
5 TABLET ORAL DAILY
Status: DISCONTINUED | OUTPATIENT
Start: 2022-02-23 | End: 2022-03-01 | Stop reason: HOSPADM

## 2022-02-23 RX ORDER — SODIUM CHLORIDE 0.9 % (FLUSH) 0.9 %
5-40 SYRINGE (ML) INJECTION PRN
Status: DISCONTINUED | OUTPATIENT
Start: 2022-02-23 | End: 2022-03-01 | Stop reason: HOSPADM

## 2022-02-23 RX ORDER — SODIUM CHLORIDE 9 MG/ML
25 INJECTION, SOLUTION INTRAVENOUS PRN
Status: DISCONTINUED | OUTPATIENT
Start: 2022-02-23 | End: 2022-03-01 | Stop reason: HOSPADM

## 2022-02-23 RX ORDER — DEXTROSE MONOHYDRATE 25 G/50ML
12.5 INJECTION, SOLUTION INTRAVENOUS PRN
Status: DISCONTINUED | OUTPATIENT
Start: 2022-02-23 | End: 2022-03-01 | Stop reason: HOSPADM

## 2022-02-23 RX ORDER — NICOTINE POLACRILEX 4 MG
15 LOZENGE BUCCAL PRN
Status: DISCONTINUED | OUTPATIENT
Start: 2022-02-23 | End: 2022-03-01 | Stop reason: HOSPADM

## 2022-02-23 RX ORDER — SODIUM CHLORIDE 0.9 % (FLUSH) 0.9 %
5-40 SYRINGE (ML) INJECTION EVERY 12 HOURS SCHEDULED
Status: DISCONTINUED | OUTPATIENT
Start: 2022-02-23 | End: 2022-03-01 | Stop reason: HOSPADM

## 2022-02-23 RX ORDER — ATORVASTATIN CALCIUM 40 MG/1
40 TABLET, FILM COATED ORAL DAILY
Status: DISCONTINUED | OUTPATIENT
Start: 2022-02-23 | End: 2022-03-01 | Stop reason: HOSPADM

## 2022-02-23 RX ORDER — INSULIN ASPART 100 [IU]/ML
45 INJECTION, SUSPENSION SUBCUTANEOUS 2 TIMES DAILY WITH MEALS
Status: DISCONTINUED | OUTPATIENT
Start: 2022-02-23 | End: 2022-02-23 | Stop reason: CLARIF

## 2022-02-23 RX ORDER — HYDROCODONE BITARTRATE AND ACETAMINOPHEN 5; 325 MG/1; MG/1
1 TABLET ORAL EVERY 6 HOURS PRN
Status: DISCONTINUED | OUTPATIENT
Start: 2022-02-23 | End: 2022-03-01 | Stop reason: HOSPADM

## 2022-02-23 RX ADMIN — INSULIN GLARGINE-YFGN 50 UNITS: 100 INJECTION, SOLUTION SUBCUTANEOUS at 10:53

## 2022-02-23 RX ADMIN — AMLODIPINE BESYLATE 5 MG: 5 TABLET ORAL at 09:21

## 2022-02-23 RX ADMIN — INSULIN LISPRO 10 UNITS: 100 INJECTION, SOLUTION INTRAVENOUS; SUBCUTANEOUS at 16:43

## 2022-02-23 RX ADMIN — Medication 2000 MG: at 10:54

## 2022-02-23 RX ADMIN — Medication 10 ML: at 10:54

## 2022-02-23 RX ADMIN — METFORMIN HYDROCHLORIDE 1000 MG: 500 TABLET, FILM COATED ORAL at 16:41

## 2022-02-23 RX ADMIN — FLUCONAZOLE 200 MG: 100 TABLET ORAL at 10:54

## 2022-02-23 RX ADMIN — Medication 2000 MG: at 19:47

## 2022-02-23 RX ADMIN — HYDROCODONE BITARTRATE AND ACETAMINOPHEN 1 TABLET: 5; 325 TABLET ORAL at 15:53

## 2022-02-23 RX ADMIN — INSULIN LISPRO 9 UNITS: 100 INJECTION, SOLUTION INTRAVENOUS; SUBCUTANEOUS at 16:41

## 2022-02-23 RX ADMIN — ATORVASTATIN CALCIUM 40 MG: 40 TABLET, FILM COATED ORAL at 09:21

## 2022-02-23 RX ADMIN — METFORMIN HYDROCHLORIDE 1000 MG: 500 TABLET, FILM COATED ORAL at 09:21

## 2022-02-23 RX ADMIN — HYDROCODONE BITARTRATE AND ACETAMINOPHEN 1 TABLET: 5; 325 TABLET ORAL at 09:21

## 2022-02-23 RX ADMIN — INSULIN LISPRO 9 UNITS: 100 INJECTION, SOLUTION INTRAVENOUS; SUBCUTANEOUS at 11:10

## 2022-02-23 RX ADMIN — INSULIN LISPRO 2 UNITS: 100 INJECTION, SOLUTION INTRAVENOUS; SUBCUTANEOUS at 21:02

## 2022-02-23 RX ADMIN — Medication 5 ML: at 21:04

## 2022-02-23 ASSESSMENT — PAIN DESCRIPTION - LOCATION
LOCATION: LEG

## 2022-02-23 ASSESSMENT — PAIN DESCRIPTION - FREQUENCY
FREQUENCY: CONTINUOUS

## 2022-02-23 ASSESSMENT — PAIN SCALES - GENERAL
PAINLEVEL_OUTOF10: 3
PAINLEVEL_OUTOF10: 8
PAINLEVEL_OUTOF10: 0
PAINLEVEL_OUTOF10: 0
PAINLEVEL_OUTOF10: 8
PAINLEVEL_OUTOF10: 8

## 2022-02-23 ASSESSMENT — PAIN DESCRIPTION - PROGRESSION: CLINICAL_PROGRESSION: GRADUALLY WORSENING

## 2022-02-23 ASSESSMENT — PAIN DESCRIPTION - ORIENTATION
ORIENTATION: LEFT

## 2022-02-23 ASSESSMENT — PAIN DESCRIPTION - DESCRIPTORS
DESCRIPTORS: BURNING;CONSTANT
DESCRIPTORS: BURNING;CONSTANT

## 2022-02-23 ASSESSMENT — PAIN DESCRIPTION - PAIN TYPE
TYPE: ACUTE PAIN

## 2022-02-23 ASSESSMENT — PAIN - FUNCTIONAL ASSESSMENT: PAIN_FUNCTIONAL_ASSESSMENT: PREVENTS OR INTERFERES SOME ACTIVE ACTIVITIES AND ADLS

## 2022-02-23 ASSESSMENT — PAIN DESCRIPTION - ONSET
ONSET: ON-GOING

## 2022-02-23 NOTE — PROGRESS NOTES
Physical Therapy  Physical Therapy Initial Assessment     Name: Karole Hodgkin  :   MRN: [id number]      Date of Service: 2022    Evaluating PT:  Margarito Fontana PT, DPT    Room #:  7197/1553-K  Diagnosis:  Hyperglycemia [R73.9]  Left leg cellulitis [B93.318]  PMHx/PSHx:  DM, HTN  Procedure/Surgery:  N/A  Precautions:  Fall risk  Equipment Needs:  TBD    SUBJECTIVE:    Pt lives with spouse in a 1 story home with no steps to enter. Bed/bath is on 1st floor. Pt ambulated with no AD PTA. OBJECTIVE:   Initial Evaluation  Date: 22 Treatment Short Term/ Long Term   Goals   AM-PAC 6 Clicks 70/17     Was pt agreeable to Eval/treatment? yes     Does pt have pain? 8/10 LLE     Bed Mobility  Rolling: NT  Supine to sit: NT  Sit to supine: NT  Scooting: SBA  Rolling: mod I  Supine to sit: mod I  Sit to supine: mod I  Scooting: mod I   Transfers Sit to stand: CGA  Stand to sit: SBA  Stand pivot: SBA with ww  Sit to stand: mod I  Stand to sit: mod I  Stand pivot: mod I with AAD   Ambulation    200'x1 with ww SBA  400'+ with AAD mod I   Stair negotiation: ascended and descended  NT  2 steps with 1 handrail mod I     Strength/ROM:   BLE grossly 3+/5, L ankle NT  BLE AROM WFL    Balance:   Static Sitting: IND  Dynamic Sitting: SBA  Static Standing: Supervision with ww  Dynamic Standing: SBA with ww    Pt is A & O x 3  Sensation:  Pt denies numbness and tingling to extremities  Edema:  L foot/ankle 2+    Therapeutic Exercises:    Transfers:  STSx1, cued for hand placement and postural correction  Ambulation: 200'x1 with ww  BLE AROM    Patient education  Pt educated on role of PT, importance of functional mobility during hospital stay, safety with ww use    Patient response to education:   Pt verbalized understanding Pt demonstrated skill Pt requires further education in this area   yes yes reinforce     ASSESSMENT:    Conditions Requiring Skilled Therapeutic Intervention:    [x]Decreased strength     [x]Decreased ROM  [x]Decreased functional mobility  [x]Decreased balance   [x]Decreased endurance   []Decreased posture  []Decreased sensation  []Decreased coordination   []Decreased vision  []Decreased safety awareness   [x]Increased pain       Comments:    Pt sitting on EOB upon entering, agreeable to participate. Pt instructed to stand from EOB, cued for hand placement. Pt provided with ww to reduce WBing on LLE as needed. Pt standing well with ww, demonstrating good static standing balance. Pt instructed to ambulate to tolerance, cueing provided for sequencing to reduce aggravating LLE pain. Pt ambulating with antalgic pattern, slow ivonne. Pt completing distance with good tolerance, motivated to ambulate further when asked if he'd like to turn back to his room. Pt eventually returned to bedside, transferring back to EOB safely. Pt was encouraged to continue ambulating with nursing during admission in addition to PT treatment. Pt remained at EOB with call bell in reach and all needs met prior to exiting. Nursing aware of pt's performance. Treatment:  Patient practiced and was instructed in the following treatment:     STS and pivot transfer training - pt educated on proper hand and foot placement, safety and sequencing, and use of verbal and tactile cues to safely complete sit<>stand and pivot transfers.  Gait training- pt was given verbal and tactile cues to facilitate sequencing and ww safety during ambulation. Pt's/ family goals   1. Return home    Prognosis is good for reaching above PT goals. Patient and or family understand(s) diagnosis, prognosis, and plan of care.   yes    PHYSICAL THERAPY PLAN OF CARE:    PT POC is established based on physician order and patient diagnosis     Referring provider/PT Order:  Za Plaza MD  Diagnosis:  Hyperglycemia [R73.9]  Left leg cellulitis [G65.046]  Specific instructions for next treatment:  Progress as tolerated, gait and transfer training    Current Treatment Recommendations:     [x] Strengthening to improve independence with functional mobility   [x] ROM to improve independence with functional mobility   [x] Balance Training to improve static/dynamic balance and to reduce fall risk  [x] Endurance Training to improve activity tolerance during functional mobility   [x] Transfer Training to improve safety and independence with all functional transfers   [x] Gait Training to improve gait mechanics, endurance and assess need for appropriate assistive device  [x] Stair Training in preparation for safe discharge home and/or into the community   [] Positioning to prevent skin breakdown and contractures  [x] Safety and Education Training   [x] Patient/Caregiver Education   [] HEP  [] Other     PT long term treatment goals are located in above grid    Frequency of treatments: 2-5x/week x 1-2 weeks. Time in  1525  Time out  1545    Total Treatment Time  10 minutes     Evaluation Time includes thorough review of current medical information, gathering information on past medical history/social history and prior level of function, completion of standardized testing/informal observation of tasks, assessment of data and education on plan of care and goals.     CPT codes:  [x] Low Complexity PT evaluation 24907  [] Moderate Complexity PT evaluation 72878  [] High Complexity PT evaluation 47342  [] PT Re-evaluation 03517  [x] Gait training 31270 10 minutes  [] Manual therapy [id number] -- minutes  [] Therapeutic activities 81033 -- minutes  [] Therapeutic exercises 36070 -- minutes  [] Neuromuscular reeducation 79896 -- minutes     Dequan Christian, PT, DPT  [id number]

## 2022-02-23 NOTE — H&P
Don Briones is a 67 y.o. male presenting to the ED for leg swelling, beginning days ago. He reports the swelling has been going on for last several days. Patient reports the redness started today. Patient reporting no fever no chills he is a diabetic. Found with swelling redness left leg and leukocytosis started on ATB and admitted for treatment     Past Medical History:   Diagnosis Date    Diabetes mellitus ([address]     Dizziness     Dizziness 3/6/2013    Hypertension     Hypertension 3/6/2013    Inner ear dysfunction     Prostate enlargement     Syncope, near 3/6/2013       Past Surgical History:   Procedure Laterality Date    COLONOSCOPY  08/06/2010    EYE SURGERY      bilateral    TONSILLECTOMY         Family History   Problem Relation Age of Onset    High Blood Pressure Father     Cancer Brother        Prior to Admission medications    Medication Sig Start Date End Date Taking? Authorizing Provider   nystatin (MYCOSTATIN) 981583 UNIT/GM cream Apply topically 2 times daily Apply topically 2 times daily. 10/27/21  Yes Malini Mccullough DO   simvastatin (ZOCOR) 40 MG tablet Take 1 tablet by mouth nightly 10/27/21  Yes Malini Mccullough DO   insulin aspart protamine-insulin aspart (NOVOLOG MIX 70/30) (70-30) 100 UNIT/ML injection Inject 45 Units into the skin 2 times daily (with meals) 9/25/20  Yes Malini Mccullough DO   HYDROcodone-acetaminophen (NORCO) 5-325 MG per tablet Take 1 tablet by mouth every 6 hours as needed for Pain for up to 30 days.  12/27/21 1/26/22  Malini Mccullough DO   amLODIPine (NORVASC) 5 MG tablet Take 1 tablet by mouth daily 10/27/21 1/25/22  Malini Mccullough DO   metFORMIN (GLUCOPHAGE) 1000 MG tablet Take 1 tablet by mouth 2 times daily (with meals) 10/27/21 1/25/22  Malini Mccullough DO   Lancets MISC 1 each by Does not apply route 2 times daily Pt uses Accu check Ambar machine 10/27/21   Malini Mccullough DO   blood glucose monitor strips Test 2 times a day & as needed for symptoms of irregular blood glucose. Dx:Code: E11.39 Pt uses Accu check Ambar machine 10/27/21   Malini Mccullough DO        Allergies: Patient has no known allergies. Social History     Tobacco Use    Smoking status: Former Smoker     Packs/day: 1.00     Years: 30.00     Pack years: 30.00     Types: Pipe     Start date:      Quit date: 2014     Years since quittin.4    Smokeless tobacco: Never Used    Tobacco comment: pipe once daily   Substance Use Topics    Alcohol use: No        Review of Systems:  Respiratory: negative for cough and hemoptysis  Cardiovascular: negative for chest pain and dyspnea  Gastrointestinal: negative for abdominal pain, diarrhea, nausea and vomiting  Genitourinary:negative for dysuria and hematuria  Derm: as per present illness   Neurological: negative for seizures and tremors  Endocrine: negative for diabetic symptoms including polydipsia and polyuria    Physical Exam:  Vitals:    22 0000   BP: (!) 168/60   Pulse: 80   Resp: 20   Temp: 99.3 °F (37.4 °C)   SpO2: 96%      Skin:  Warm and dry. No rash or bruises  HEENT:  PERRLA, EOMI  Neck:  No JVD, No thyromegaly, No carotid bruit  Cardiac:  RRR, No gallop or murmur  Lungs:  CTA, Normal percussion  Abdomen: Normal bowel sounds, no HSM, non-tender,obese   Extremities:  No clubbing, swelling left leg warm erythema mycotic toe nails   Neurological:  Moves all extremities.     Labs:    CBC with Differential:    Lab Results   Component Value Date    WBC 15.2 2022    RBC 4.46 2022    HGB 13.5 2022    HCT 41.6 2022     2022    MCV 93.3 2022    MCH 30.3 2022    MCHC 32.5 2022    RDW 12.3 2022    LYMPHOPCT 10.0 2022    MONOPCT 7.4 2022    BASOPCT 0.3 2022    MONOSABS 1.13 2022    LYMPHSABS 1.53 2022    EOSABS 0.01 2022    BASOSABS 0.04 2022     CMP:    Lab Results   Component Value Date     2022    K 4.9 2022    CL 97 02/22/2022    CO2 26 02/22/2022    BUN 23 02/22/2022    CREATININE 1.1 02/22/2022    GFRAA >60 02/22/2022    LABGLOM >60 02/22/2022    GLUCOSE 373 02/22/2022    PROT 7.9 02/22/2022    LABALBU 3.8 02/22/2022    CALCIUM 9.2 02/22/2022    BILITOT 0.4 02/22/2022    ALKPHOS 121 02/22/2022    AST 22 02/22/2022    ALT 30 02/22/2022      Imaging:US neg.  For DVT       Assessment and Plan:    Patient Active Problem List   Diagnosis     Cellulitis left leg   Leukocytosis due to above  Mycotic toe nails   DM  HTN

## 2022-02-23 NOTE — ED PROVIDER NOTES
HPI:  2/22/22, Time: 9:35 PM GORDON Briones is a 67 y.o. male presenting to the ED for leg swelling, beginning days ago. The complaint has been constant, moderate in severity, and worsened by nothing. He reports the swelling has been going on for last several days. Patient reports the redness started today. Patient reporting no fever no chills he is a diabetic. Patient was seen at reportedly emergency department sent here for ultrasound. Patient reporting no abdominal pain no vomiting reports no productive cough he reports no pleuritic chest pain. Patient reporting no weakness he is having difficulty ambulating with the pain. ROS:   Pertinent positives and negatives are stated within HPI, all other systems reviewed and are negative.  --------------------------------------------- PAST HISTORY ---------------------------------------------  Past Medical History:  has a past medical history of Diabetes mellitus ([address] Dizziness, Dizziness, Hypertension, Hypertension, Inner ear dysfunction, Prostate enlargement, and Syncope, near. Past Surgical History:  has a past surgical history that includes Tonsillectomy; eye surgery; and Colonoscopy (08/06/2010). Social History:  reports that he quit smoking about 7 years ago. His smoking use included pipe. He started smoking about 38 years ago. He has a 30.00 pack-year smoking history. He has never used smokeless tobacco. He reports that he does not drink alcohol and does not use drugs. Family History: family history includes Cancer in his brother; High Blood Pressure in his father. The patients home medications have been reviewed. Allergies: Patient has no known allergies.     ---------------------------------------------------PHYSICAL EXAM--------------------------------------    Constitutional/General: Alert and oriented x3, well appearing, non toxic in NAD  Head: Normocephalic and atraumatic  Eyes: PERRL, EOMI  Mouth: Oropharynx clear, handling secretions, no trismus  Neck: Supple, full ROM, non tender to palpation in the midline, no stridor, no crepitus, no meningeal signs  Pulmonary: Lungs clear to auscultation bilaterally, no wheezes, rales, or rhonchi. Not in respiratory distress  Cardiovascular:  Regular rate. Regular rhythm. No murmurs, gallops, or rubs. 2+ distal pulses  Chest: no chest wall tenderness  Abdomen: Soft. Non tender. Non distended. +BS. No rebound, guarding, or rigidity. No pulsatile masses appreciated. Musculoskeletal: Moves all extremities x 4. Warm and well perfused, no clubbing, cyanosis, noted edema to left lower extremity noted redness to dorsum of foot to mid tibia region  Skin: warm and dry. No rashes. Neurologic: GCS 15, CN 2-12 grossly intact, no focal deficits, symmetric strength 5/5 in the upper and lower extremities bilaterally  Psych: Normal Affect    -------------------------------------------------- RESULTS -------------------------------------------------  I have personally reviewed all laboratory and imaging results for this patient. Results are listed below.      LABS:  Results for orders placed or performed during the hospital encounter of 02/22/22   Troponin   Result Value Ref Range    Troponin, High Sensitivity 35 (H) 0 - 11 ng/L   CBC with Auto Differential   Result Value Ref Range    WBC 15.2 (H) 4.5 - 11.5 E9/L    RBC 4.46 3.80 - 5.80 E12/L    Hemoglobin 13.5 12.5 - 16.5 g/dL    Hematocrit 41.6 37.0 - 54.0 %    MCV 93.3 80.0 - 99.9 fL    MCH 30.3 26.0 - 35.0 pg    MCHC 32.5 32.0 - 34.5 %    RDW 12.3 11.5 - 15.0 fL    Platelets 793 837 - 894 E9/L    MPV 9.8 7.0 - 12.0 fL    Neutrophils % 81.7 (H) 43.0 - 80.0 %    Immature Granulocytes % 0.5 0.0 - 5.0 %    Lymphocytes % 10.0 (L) 20.0 - 42.0 %    Monocytes % 7.4 2.0 - 12.0 %    Eosinophils % 0.1 0.0 - 6.0 %    Basophils % 0.3 0.0 - 2.0 %    Neutrophils Absolute 12.46 (H) 1.80 - 7.30 E9/L    Immature Granulocytes # 0.07 E9/L    Lymphocytes Absolute 1. 53 1.50 - 4.00 E9/L    Monocytes Absolute 1.13 (H) 0.10 - 0.95 E9/L    Eosinophils Absolute 0.01 (L) 0.05 - 0.50 E9/L    Basophils Absolute 0.04 0.00 - 0.20 E9/L   Comprehensive Metabolic Panel   Result Value Ref Range    Sodium 134 132 - 146 mmol/L    Potassium 4.9 3.5 - 5.0 mmol/L    Chloride 97 (L) 98 - 107 mmol/L    CO2 26 22 - 29 mmol/L    Anion Gap 11 7 - 16 mmol/L    Glucose 373 (H) 74 - 99 mg/dL    BUN 23 6 - 23 mg/dL    CREATININE 1.1 0.7 - 1.2 mg/dL    GFR Non-African American >60 >=60 mL/min/1.73    GFR African American >60     Calcium 9.2 8.6 - 10.2 mg/dL    Total Protein 7.9 6.4 - 8.3 g/dL    Albumin 3.8 3.5 - 5.2 g/dL    Total Bilirubin 0.4 0.0 - 1.2 mg/dL    Alkaline Phosphatase 121 40 - 129 U/L    ALT 30 0 - 40 U/L    AST 22 0 - 39 U/L   Brain Natriuretic Peptide   Result Value Ref Range    Pro- (H) 0 - 450 pg/mL   Lactic Acid   Result Value Ref Range    Lactic Acid 1.3 0.5 - 2.2 mmol/L   EKG 12 Lead   Result Value Ref Range    Ventricular Rate 65 BPM    Atrial Rate 65 BPM    P-R Interval 176 ms    QRS Duration 104 ms    Q-T Interval 406 ms    QTc Calculation (Bazett) 422 ms    P Axis 19 degrees    R Axis -33 degrees    T Axis 88 degrees       RADIOLOGY:  Interpreted by Radiologist.  XR CHEST PORTABLE   Final Result   No acute process. US DUP LOWER EXTREMITY LEFT RAMIREZ   Final Result   Within the visualized vessels there is no evidence for deep venous   thrombosis                     EKG:  This EKG is signed and interpreted by me. Rate: 65  Rhythm: Sinus  Interpretation: non-specific EKG  Comparison: stable as compared to patient's most recent EKG          ------------------------- NURSING NOTES AND VITALS REVIEWED ---------------------------   The nursing notes within the ED encounter and vital signs as below have been reviewed by myself.   BP (!) 208/96   Pulse 66   Temp 98.1 °F (36.7 °C) (Oral)   Resp 18   Ht 5' 10\" (1.778 m)   Wt 250 lb (113.4 kg)   SpO2 95%   BMI 35.87 kg/m²   Oxygen Saturation Interpretation: Normal    The patients available past medical records and past encounters were reviewed. ------------------------------ ED COURSE/MEDICAL DECISION MAKING----------------------  Medications   ceFAZolin (ANCEF) 1,000 mg in sterile water 10 mL IV syringe (has no administration in time range)   clindamycin (CLEOCIN) 600 mg in dextrose 5 % 50 mL IVPB (600 mg IntraVENous New Bag 2/22/22 2203)   HYDROcodone-acetaminophen (NORCO) 5-325 MG per tablet 1 tablet (1 tablet Oral Given 2/22/22 1840)   amLODIPine (NORVASC) tablet 5 mg (5 mg Oral Given 2/22/22 1839)             Medical Decision Making:    Patient presenting here because of left leg swelling that started days ago. Noticed redness today. Patient reporting no fever no chills no chest pain. Patient does have history of diabetes hypertension. Labs are reviewed white count over 15,000 and his sugar is elevated. Patient is not in DKA. Patient ordered IV antibiotics blood cultures. Patient will be admitted to monitored bed. Re-Evaluations:             Re-evaluation. Patients symptoms show no change  Patient reeval made aware of findings and plan. Patient will be admitted. Patient wife at bedside    Consultations:             Dr Carolyn Rodas: This patient's ED course included: a personal history and physicial eaxmination    This patient has been closely monitored during their ED course. Counseling: The emergency provider has spoken with the patient and discussed todays results, in addition to providing specific details for the plan of care and counseling regarding the diagnosis and prognosis. Questions are answered at this time and they are agreeable with the plan.       --------------------------------- IMPRESSION AND DISPOSITION ---------------------------------    IMPRESSION  1. Left leg cellulitis    2.  Hyperglycemia        DISPOSITION  Disposition: Admit to telemetry  Patient condition is stable        NOTE: This report was transcribed using voice recognition software.  Every effort was made to ensure accuracy; however, inadvertent computerized transcription errors may be present          Jessica Sotelo MD  02/22/22 [id number]

## 2022-02-23 NOTE — CARE COORDINATION
Patient presented to the ED for leg pain, swelling and redness. Met with patient at bedside for transition of care planning. Patient was asleep, asked for SW to come back at a later time and did not want to answer questions. Per nursing rounds, patient's glucose 348 today, was started on IV Ancef Q8 and blood cultures pending. SW/CM will continue to follow for discharge planning.     Audra Schulte, MSW, LSW [phone]

## 2022-02-24 LAB
ACINETOBACTER CALCOAC BAUMANNII COMPLEX BY PCR: NOT DETECTED
ANION GAP SERPL CALCULATED.3IONS-SCNC: 12 MMOL/L (ref 7–16)
BACTEROIDES FRAGILIS BY PCR: NOT DETECTED
BASOPHILS ABSOLUTE: 0.04 E9/L (ref 0–0.2)
BASOPHILS RELATIVE PERCENT: 0.3 % (ref 0–2)
BOTTLE TYPE: ABNORMAL
BUN BLDV-MCNC: 28 MG/DL (ref 6–23)
CALCIUM SERPL-MCNC: 9 MG/DL (ref 8.6–10.2)
CANDIDA ALBICANS BY PCR: NOT DETECTED
CANDIDA AURIS BY PCR: NOT DETECTED
CANDIDA GLABRATA BY PCR: NOT DETECTED
CANDIDA KRUSEI BY PCR: NOT DETECTED
CANDIDA PARAPSILOSIS BY PCR: NOT DETECTED
CANDIDA TROPICALIS BY PCR: NOT DETECTED
CHLORIDE BLD-SCNC: 100 MMOL/L (ref 98–107)
CO2: 25 MMOL/L (ref 22–29)
CREAT SERPL-MCNC: 1.1 MG/DL (ref 0.7–1.2)
CRYPTOCOCCUS NEOFORMANS/GATTII BY PCR: NOT DETECTED
ENTEROBACTER CLOACAE COMPLEX BY PCR: NOT DETECTED
ENTEROBACTERALES BY PCR: NOT DETECTED
ENTEROCOCCUS FAECALIS BY PCR: NOT DETECTED
ENTEROCOCCUS FAECIUM BY PCR: NOT DETECTED
EOSINOPHILS ABSOLUTE: 0.02 E9/L (ref 0.05–0.5)
EOSINOPHILS RELATIVE PERCENT: 0.1 % (ref 0–6)
ESCHERICHIA COLI BY PCR: NOT DETECTED
GFR AFRICAN AMERICAN: >60
GFR NON-AFRICAN AMERICAN: >60 ML/MIN/1.73
GLUCOSE BLD-MCNC: 163 MG/DL (ref 74–99)
HAEMOPHILUS INFLUENZAE BY PCR: NOT DETECTED
HCT VFR BLD CALC: 40.9 % (ref 37–54)
HEMOGLOBIN: 13.4 G/DL (ref 12.5–16.5)
IMMATURE GRANULOCYTES #: 0.1 E9/L
IMMATURE GRANULOCYTES %: 0.7 % (ref 0–5)
KLEBSIELLA AEROGENES BY PCR: NOT DETECTED
KLEBSIELLA OXYTOCA BY PCR: NOT DETECTED
KLEBSIELLA PNEUMONIAE GROUP BY PCR: NOT DETECTED
LISTERIA MONOCYTOGENES BY PCR: NOT DETECTED
LYMPHOCYTES ABSOLUTE: 1.69 E9/L (ref 1.5–4)
LYMPHOCYTES RELATIVE PERCENT: 11.8 % (ref 20–42)
MCH RBC QN AUTO: 30 PG (ref 26–35)
MCHC RBC AUTO-ENTMCNC: 32.8 % (ref 32–34.5)
MCV RBC AUTO: 91.7 FL (ref 80–99.9)
METER GLUCOSE: 153 MG/DL (ref 74–99)
METER GLUCOSE: 190 MG/DL (ref 74–99)
METER GLUCOSE: 283 MG/DL (ref 74–99)
METER GLUCOSE: 325 MG/DL (ref 74–99)
METHICILLIN RESISTANCE MECA/C  BY PCR: NOT DETECTED
MONOCYTES ABSOLUTE: 1.02 E9/L (ref 0.1–0.95)
MONOCYTES RELATIVE PERCENT: 7.1 % (ref 2–12)
NEISSERIA MENINGITIDIS BY PCR: NOT DETECTED
NEUTROPHILS ABSOLUTE: 11.42 E9/L (ref 1.8–7.3)
NEUTROPHILS RELATIVE PERCENT: 80 % (ref 43–80)
ORDER NUMBER: ABNORMAL
PDW BLD-RTO: 12.4 FL (ref 11.5–15)
PLATELET # BLD: 275 E9/L (ref 130–450)
PMV BLD AUTO: 9.9 FL (ref 7–12)
POTASSIUM SERPL-SCNC: 4.2 MMOL/L (ref 3.5–5)
PROTEUS SPECIES BY PCR: NOT DETECTED
PSEUDOMONAS AERUGINOSA BY PCR: NOT DETECTED
RBC # BLD: 4.46 E12/L (ref 3.8–5.8)
SALMONELLA SPECIES BY PCR: NOT DETECTED
SERRATIA MARCESCENS BY PCR: NOT DETECTED
SODIUM BLD-SCNC: 137 MMOL/L (ref 132–146)
SOURCE OF BLOOD CULTURE: ABNORMAL
STAPHYLOCOCCUS AUREUS BY PCR: NOT DETECTED
STAPHYLOCOCCUS EPIDERMIDIS BY PCR: DETECTED
STAPHYLOCOCCUS LUGDUNENSIS BY PCR: NOT DETECTED
STAPHYLOCOCCUS SPECIES BY PCR: DETECTED
STENOTROPHOMONAS MALTOPHILIA BY PCR: NOT DETECTED
STREPTOCOCCUS AGALACTIAE BY PCR: NOT DETECTED
STREPTOCOCCUS PNEUMONIAE BY PCR: NOT DETECTED
STREPTOCOCCUS PYOGENES  BY PCR: NOT DETECTED
STREPTOCOCCUS SPECIES BY PCR: NOT DETECTED
WBC # BLD: 14.3 E9/L (ref 4.5–11.5)

## 2022-02-24 PROCEDURE — 6370000000 HC RX 637 (ALT 250 FOR IP): Performed by: INTERNAL MEDICINE

## 2022-02-24 PROCEDURE — 36415 COLL VENOUS BLD VENIPUNCTURE: CPT

## 2022-02-24 PROCEDURE — S5553 INSULIN LONG ACTING 5 U: HCPCS | Performed by: INTERNAL MEDICINE

## 2022-02-24 PROCEDURE — 80048 BASIC METABOLIC PNL TOTAL CA: CPT

## 2022-02-24 PROCEDURE — 97535 SELF CARE MNGMENT TRAINING: CPT

## 2022-02-24 PROCEDURE — 2140000000 HC CCU INTERMEDIATE R&B

## 2022-02-24 PROCEDURE — 2500000003 HC RX 250 WO HCPCS: Performed by: INTERNAL MEDICINE

## 2022-02-24 PROCEDURE — 97165 OT EVAL LOW COMPLEX 30 MIN: CPT

## 2022-02-24 PROCEDURE — 85025 COMPLETE CBC W/AUTO DIFF WBC: CPT

## 2022-02-24 PROCEDURE — 82962 GLUCOSE BLOOD TEST: CPT

## 2022-02-24 PROCEDURE — 2580000003 HC RX 258: Performed by: INTERNAL MEDICINE

## 2022-02-24 PROCEDURE — 6360000002 HC RX W HCPCS: Performed by: INTERNAL MEDICINE

## 2022-02-24 RX ORDER — LISINOPRIL 5 MG/1
10 TABLET ORAL DAILY
Status: DISCONTINUED | OUTPATIENT
Start: 2022-02-24 | End: 2022-03-01 | Stop reason: HOSPADM

## 2022-02-24 RX ADMIN — INSULIN LISPRO 9 UNITS: 100 INJECTION, SOLUTION INTRAVENOUS; SUBCUTANEOUS at 09:21

## 2022-02-24 RX ADMIN — INSULIN GLARGINE-YFGN 50 UNITS: 100 INJECTION, SOLUTION SUBCUTANEOUS at 09:10

## 2022-02-24 RX ADMIN — ATORVASTATIN CALCIUM 40 MG: 40 TABLET, FILM COATED ORAL at 09:26

## 2022-02-24 RX ADMIN — LISINOPRIL 10 MG: 5 TABLET ORAL at 12:04

## 2022-02-24 RX ADMIN — FLUCONAZOLE 200 MG: 100 TABLET ORAL at 09:23

## 2022-02-24 RX ADMIN — INSULIN LISPRO 2 UNITS: 100 INJECTION, SOLUTION INTRAVENOUS; SUBCUTANEOUS at 09:10

## 2022-02-24 RX ADMIN — INSULIN LISPRO 3 UNITS: 100 INJECTION, SOLUTION INTRAVENOUS; SUBCUTANEOUS at 21:22

## 2022-02-24 RX ADMIN — INSULIN LISPRO 8 UNITS: 100 INJECTION, SOLUTION INTRAVENOUS; SUBCUTANEOUS at 12:03

## 2022-02-24 RX ADMIN — INSULIN LISPRO 2 UNITS: 100 INJECTION, SOLUTION INTRAVENOUS; SUBCUTANEOUS at 18:28

## 2022-02-24 RX ADMIN — AMLODIPINE BESYLATE 5 MG: 5 TABLET ORAL at 09:12

## 2022-02-24 RX ADMIN — METFORMIN HYDROCHLORIDE 1000 MG: 500 TABLET, FILM COATED ORAL at 18:28

## 2022-02-24 RX ADMIN — Medication 10 ML: at 21:22

## 2022-02-24 RX ADMIN — Medication 2000 MG: at 21:22

## 2022-02-24 RX ADMIN — INSULIN LISPRO 9 UNITS: 100 INJECTION, SOLUTION INTRAVENOUS; SUBCUTANEOUS at 12:04

## 2022-02-24 RX ADMIN — HYDROCODONE BITARTRATE AND ACETAMINOPHEN 1 TABLET: 5; 325 TABLET ORAL at 09:12

## 2022-02-24 RX ADMIN — HYDROCODONE BITARTRATE AND ACETAMINOPHEN 1 TABLET: 5; 325 TABLET ORAL at 18:30

## 2022-02-24 RX ADMIN — Medication 2000 MG: at 12:31

## 2022-02-24 RX ADMIN — Medication 10 ML: at 09:13

## 2022-02-24 RX ADMIN — INSULIN LISPRO 9 UNITS: 100 INJECTION, SOLUTION INTRAVENOUS; SUBCUTANEOUS at 18:29

## 2022-02-24 RX ADMIN — Medication 2000 MG: at 02:28

## 2022-02-24 RX ADMIN — METFORMIN HYDROCHLORIDE 1000 MG: 500 TABLET, FILM COATED ORAL at 09:12

## 2022-02-24 RX ADMIN — SODIUM CHLORIDE, PRESERVATIVE FREE 10 ML: 5 INJECTION INTRAVENOUS at 12:30

## 2022-02-24 ASSESSMENT — PAIN DESCRIPTION - ORIENTATION
ORIENTATION: LEFT;LOWER

## 2022-02-24 ASSESSMENT — PAIN SCALES - GENERAL
PAINLEVEL_OUTOF10: 0
PAINLEVEL_OUTOF10: 8
PAINLEVEL_OUTOF10: 0
PAINLEVEL_OUTOF10: 8
PAINLEVEL_OUTOF10: 8
PAINLEVEL_OUTOF10: 0
PAINLEVEL_OUTOF10: 0
PAINLEVEL_OUTOF10: 5
PAINLEVEL_OUTOF10: 0

## 2022-02-24 ASSESSMENT — PAIN DESCRIPTION - FREQUENCY
FREQUENCY: CONTINUOUS

## 2022-02-24 ASSESSMENT — PAIN DESCRIPTION - LOCATION
LOCATION: LEG

## 2022-02-24 ASSESSMENT — PAIN DESCRIPTION - PAIN TYPE
TYPE: ACUTE PAIN

## 2022-02-24 ASSESSMENT — PAIN DESCRIPTION - DESCRIPTORS
DESCRIPTORS: ACHING;CONSTANT;SORE;THROBBING
DESCRIPTORS: ACHING

## 2022-02-24 ASSESSMENT — PAIN DESCRIPTION - ONSET
ONSET: ON-GOING

## 2022-02-24 NOTE — PROGRESS NOTES
Called Dr.El Bustamante's answering service to notify him of patient's positive blood cultures. Dr. Roxana Habermann returned call. States patient is already ordered Ancef.

## 2022-02-24 NOTE — PROGRESS NOTES
Occupational Therapy  OCCUPATIONAL THERAPY INITIAL EVALUATION    [address] [address] [address]      Date:2022                                                Patient Name: Isabella Garcia  MRN: [id number]  : 1945  Room: [address] [place] [address]    Evaluating OT: Ariana Mitchell OTR/L #[id number]     Referring Provider: Michelle Naranjo MD  Specific Provider Orders/Date: OT eval and treat 22    Diagnosis: Hyperglycemia [R73.9]  Left leg cellulitis [B13.227]   Pt admitted to hospital with LE edema     Pertinent Medical History:  has a past medical history of Diabetes mellitus ([facility] [address] Dizziness, Dizziness, Hypertension, Hypertension, Inner ear dysfunction, Prostate enlargement, and Syncope, near.        Precautions:  Fall Risk    Assessment of current deficits    [x] Functional mobility  [x]ADLs  [x] Strength               []Cognition    [x] Functional transfers   [x] IADLs         [x] Safety Awareness   [x]Endurance    [] Fine Coordination              [x] Balance      [] Vision/perception   []Sensation     []Gross Motor Coordination  [] ROM  [] Delirium                   [] Motor Control     OT PLAN OF CARE   OT POC based on physician orders, patient diagnosis and results of clinical assessment    Frequency/Duration 1-3 days/wk for 2 weeks PRN   Specific OT Treatment Interventions to include:   * Instruction/training on adapted ADL techniques and AE recommendations to increase functional independence within precautions       * Training on energy conservation strategies, correct breathing pattern and techniques to improve independence/tolerance for self-care routine  * Functional transfer/mobility training/DME recommendations for increased independence, safety, and fall prevention  * Patient/Family education to increase follow through with safety techniques and functional independence  * Recommendation of environmental modifications for increased safety with functional transfers/mobility and ADLs  * Therapeutic exercise to improve motor endurance, ROM, and functional strength for ADLs/functional transfers  * Therapeutic activities to facilitate/challenge dynamic balance, stand tolerance for increased safety and independence with ADLs  * Therapeutic activities to facilitate gross/fine motor skills for increased independence with ADLs  * Neuro-muscular re-education: facilitation of righting/equilibrium reactions, midline orientation, scapular stability/mobility, normalization of muscle tone, and facilitation of volitional active controled movement    Recommended Adaptive Equipment:  TBD     Home Living: Pt lives with wife in a 1 story home with level entry    Bathroom setup: tub/shower combo    Equipment owned: none    Prior Level of Function: Independent with ADLs , Independent with IADLs; ambulated without AD   Driving: yes   Occupation: na    Pain Level: Pt reports L LE pain rated at 8-9/10; Therapist facilitated repositioning to address pain      Cognition: A&O: 4/4; Follows 2 step directions   Memory:  good   Sequencing:  good   Problem solving:  good   Judgement/safety:  fair     Functional Assessment:  AM-PAC Daily Activity Raw Score: 20/24   Initial Eval Status  Date: 2/24/22 Treatment Status  Date: STGs = LTGs  Time frame: 10-14 days   Feeding Independent      Grooming Stand by Assist     Standing at sink    Modified [place]    UB Dressing Independent       LB Dressing Minimal Assist   Modified [place]    Bathing Minimal Assist  Modified [place]    Toileting Stand by Assist   Modified [place]    Bed Mobility  Supine to sit: NT   Sit to supine: NT     Supine to sit:  Independent   Sit to supine: Independent    Functional Transfers Stand by Assist   Modified [place]    Functional Mobility Stand by Assist     Ambulated in room including to/from bathroom with w/w ; cuing on technique and sequencing due to L LE pain  Modified [place] Balance Sitting:     Static:  indep    Dynamic: indep  Standing: SBA     Activity Tolerance F    Limited due to L LE pain and fatigue  G   Visual/  Perceptual Glasses: reading  wfl                    Hand Dominance right   Strength ROM Additional Info:    RUE   4/5 wfl good  and wfl FMC/dexterity noted during ADL tasks     LUE 4/5 wfl good  and wfl FMC/dexterity noted during ADL tasks     Hearing: wfl  Sensation:wfl  Tone: wfl  Edema:none noted     Comments: Upon arrival patient seated at EOB and agreeable to OT Session. Therapist educated pt on role of OT. At end of session, patient seated at EOB with call light and phone within reach, all lines and tubes intact. Overall patient demonstrated decreased independence and safety during completion of ADL/functional transfer/mobility tasks. Pt would benefit from continued skilled OT to increase safety and independence with completion of ADL/IADL tasks for functional independence and quality of life. Treatment: OT treatment provided this date includes: Facilitation of functional transfers (various surfaces: bed, toilet), standing tolerance tasks (addressing posture, balance and activity tolerance; impacting ADLs and functional activity) and functional ambulation tasks with w/w (including to/ from bathroom and in preparation for item retrieval tasks; cuing on posture, w/w management and safety) - skilled cuing on hand placement, posture, body mechanics, energy conservation techniques and safety. Therapist facilitated self-care retraining: UB/LB self-care tasks (simulated gown, socks), simulated toileting task and standing grooming tasks at sink while educating pt on modified techniques, posture, safety and energy conservation techniques. Skilled monitoring of HR, O2 sats and pts response to treatment.        Rehab Potential: Good  for established goals     Patient / Family Goal: return home      Patient and/or family were instructed on functional diagnosis,

## 2022-02-25 LAB
METER GLUCOSE: 137 MG/DL (ref 74–99)
METER GLUCOSE: 194 MG/DL (ref 74–99)
METER GLUCOSE: 208 MG/DL (ref 74–99)
METER GLUCOSE: 319 MG/DL (ref 74–99)

## 2022-02-25 PROCEDURE — 2500000003 HC RX 250 WO HCPCS: Performed by: INTERNAL MEDICINE

## 2022-02-25 PROCEDURE — 82962 GLUCOSE BLOOD TEST: CPT

## 2022-02-25 PROCEDURE — S5553 INSULIN LONG ACTING 5 U: HCPCS | Performed by: INTERNAL MEDICINE

## 2022-02-25 PROCEDURE — 6370000000 HC RX 637 (ALT 250 FOR IP): Performed by: INTERNAL MEDICINE

## 2022-02-25 PROCEDURE — 2580000003 HC RX 258: Performed by: INTERNAL MEDICINE

## 2022-02-25 PROCEDURE — 6360000002 HC RX W HCPCS: Performed by: INTERNAL MEDICINE

## 2022-02-25 PROCEDURE — 2140000000 HC CCU INTERMEDIATE R&B

## 2022-02-25 RX ADMIN — Medication 2000 MG: at 03:17

## 2022-02-25 RX ADMIN — METFORMIN HYDROCHLORIDE 1000 MG: 500 TABLET, FILM COATED ORAL at 17:19

## 2022-02-25 RX ADMIN — ATORVASTATIN CALCIUM 40 MG: 40 TABLET, FILM COATED ORAL at 08:35

## 2022-02-25 RX ADMIN — INSULIN GLARGINE-YFGN 50 UNITS: 100 INJECTION, SOLUTION SUBCUTANEOUS at 09:00

## 2022-02-25 RX ADMIN — METFORMIN HYDROCHLORIDE 1000 MG: 500 TABLET, FILM COATED ORAL at 08:35

## 2022-02-25 RX ADMIN — LISINOPRIL 10 MG: 5 TABLET ORAL at 08:35

## 2022-02-25 RX ADMIN — AMLODIPINE BESYLATE 5 MG: 5 TABLET ORAL at 08:35

## 2022-02-25 RX ADMIN — INSULIN LISPRO 2 UNITS: 100 INJECTION, SOLUTION INTRAVENOUS; SUBCUTANEOUS at 17:11

## 2022-02-25 RX ADMIN — FLUCONAZOLE 200 MG: 100 TABLET ORAL at 08:35

## 2022-02-25 RX ADMIN — INSULIN LISPRO 8 UNITS: 100 INJECTION, SOLUTION INTRAVENOUS; SUBCUTANEOUS at 12:03

## 2022-02-25 RX ADMIN — INSULIN LISPRO 4 UNITS: 100 INJECTION, SOLUTION INTRAVENOUS; SUBCUTANEOUS at 08:00

## 2022-02-25 RX ADMIN — Medication 5 ML: at 08:40

## 2022-02-25 RX ADMIN — VANCOMYCIN HYDROCHLORIDE 1000 MG: 1 INJECTION, POWDER, LYOPHILIZED, FOR SOLUTION INTRAVENOUS at 08:36

## 2022-02-25 RX ADMIN — INSULIN LISPRO 9 UNITS: 100 INJECTION, SOLUTION INTRAVENOUS; SUBCUTANEOUS at 17:12

## 2022-02-25 RX ADMIN — Medication 2000 MG: at 19:29

## 2022-02-25 RX ADMIN — INSULIN LISPRO 9 UNITS: 100 INJECTION, SOLUTION INTRAVENOUS; SUBCUTANEOUS at 12:02

## 2022-02-25 RX ADMIN — Medication 10 ML: at 20:54

## 2022-02-25 RX ADMIN — VANCOMYCIN HYDROCHLORIDE 1000 MG: 1 INJECTION, POWDER, LYOPHILIZED, FOR SOLUTION INTRAVENOUS at 20:54

## 2022-02-25 RX ADMIN — INSULIN LISPRO 9 UNITS: 100 INJECTION, SOLUTION INTRAVENOUS; SUBCUTANEOUS at 08:00

## 2022-02-25 RX ADMIN — Medication 2000 MG: at 11:53

## 2022-02-25 RX ADMIN — HYDROCODONE BITARTRATE AND ACETAMINOPHEN 1 TABLET: 5; 325 TABLET ORAL at 03:20

## 2022-02-25 RX ADMIN — HYDROCODONE BITARTRATE AND ACETAMINOPHEN 1 TABLET: 5; 325 TABLET ORAL at 17:19

## 2022-02-25 ASSESSMENT — PAIN SCALES - GENERAL
PAINLEVEL_OUTOF10: 3
PAINLEVEL_OUTOF10: 0
PAINLEVEL_OUTOF10: 3
PAINLEVEL_OUTOF10: 6
PAINLEVEL_OUTOF10: 7

## 2022-02-25 NOTE — PLAN OF CARE
Problem: Pain:  Goal: Pain level will decrease  Description: Pain level will decrease  Outcome: Ongoing  Goal: Control of acute pain  Description: Control of acute pain  Outcome: Ongoing  Goal: Control of chronic pain  Description: Control of chronic pain  Outcome: Ongoing O-L Flap Text: The defect edges were debeveled with a #15 scalpel blade.  Given the location of the defect, shape of the defect and the proximity to free margins an O-L flap was deemed most appropriate.  Using a sterile surgical marker, an appropriate advancement flap was drawn incorporating the defect and placing the expected incisions within the relaxed skin tension lines where possible.    The area thus outlined was incised deep to adipose tissue with a #15 scalpel blade.  The skin margins were undermined to an appropriate distance in all directions utilizing iris scissors.

## 2022-02-25 NOTE — PROGRESS NOTES
Admit Date: 2/22/2022    Subjective:     Awake foot pain     Objective:     Patient Vitals for the past 8 hrs:   BP Pulse SpO2   02/25/22 0323 (!) 188/86 65 100 %     I/O last 3 completed shifts: In: 2060 [P.O.:2020; I.V.:40]  Out: 1150 [Urine:1150]  I/O this shift:  In: -   Out: 300 [Urine:300]    HEENT: Normal  NECK: Thyroid normal. No carotid bruit. No lymphphadenopathy. CVS: RRR  RS: Clear. No wheeze. No rhonchi. Good airflow bilaterally. ABD: Soft. Non tender. No mass. Normal BS. EXT: No edema. Non tender.  Swelling left foot less erythema   NEURO:no focal deficit       Scheduled Meds:   lisinopril  10 mg Oral Daily    amLODIPine  5 mg Oral Daily    metFORMIN  1,000 mg Oral BID WC    atorvastatin  40 mg Oral Daily    ceFAZolin  2,000 mg IntraVENous Q8H    fluconazole  200 mg Oral Daily    insulin glargine  50 Units SubCUTAneous Daily    And    insulin lispro  9 Units SubCUTAneous TID WC    sodium chloride flush  5-40 mL IntraVENous 2 times per day    insulin lispro  0-12 Units SubCUTAneous TID WC    insulin lispro  0-6 Units SubCUTAneous Nightly     Continuous Infusions:   dextrose      sodium chloride         CBC with Differential:    Lab Results   Component Value Date    WBC 14.3 02/24/2022    RBC 4.46 02/24/2022    HGB 13.4 02/24/2022    HCT 40.9 02/24/2022     02/24/2022    MCV 91.7 02/24/2022    MCH 30.0 02/24/2022    MCHC 32.8 02/24/2022    RDW 12.4 02/24/2022    LYMPHOPCT 11.8 02/24/2022    MONOPCT 7.1 02/24/2022    BASOPCT 0.3 02/24/2022    MONOSABS 1.02 02/24/2022    LYMPHSABS 1.69 02/24/2022    EOSABS 0.02 02/24/2022    BASOSABS 0.04 02/24/2022     CMP:    Lab Results   Component Value Date     02/24/2022    K 4.2 02/24/2022     02/24/2022    CO2 25 02/24/2022    BUN 28 02/24/2022    CREATININE 1.1 02/24/2022    GFRAA >60 02/24/2022    LABGLOM >60 02/24/2022    PROT 7.9 02/22/2022    LABALBU 3.8 02/22/2022    CALCIUM 9.0 02/24/2022    BILITOT 0.4 02/22/2022 ALKPHOS 121 02/22/2022    AST 22 02/22/2022    ALT 30 02/22/2022     Blood culture GPC sensitivity pending     Assessment:     Principal Problem:   Cellulitis left leg   GPC bacteremia   Leukocytosis due to above  Mycotic toe nails   DM  HTN      Plan:   Add vancomycin pending sensitivity

## 2022-02-26 LAB
BASOPHILS ABSOLUTE: 0.05 E9/L (ref 0–0.2)
BASOPHILS RELATIVE PERCENT: 0.4 % (ref 0–2)
EOSINOPHILS ABSOLUTE: 0.04 E9/L (ref 0.05–0.5)
EOSINOPHILS RELATIVE PERCENT: 0.3 % (ref 0–6)
HCT VFR BLD CALC: 41.8 % (ref 37–54)
HEMOGLOBIN: 13.3 G/DL (ref 12.5–16.5)
IMMATURE GRANULOCYTES #: 0.11 E9/L
IMMATURE GRANULOCYTES %: 0.8 % (ref 0–5)
LYMPHOCYTES ABSOLUTE: 1.9 E9/L (ref 1.5–4)
LYMPHOCYTES RELATIVE PERCENT: 13.8 % (ref 20–42)
MCH RBC QN AUTO: 30.3 PG (ref 26–35)
MCHC RBC AUTO-ENTMCNC: 31.8 % (ref 32–34.5)
MCV RBC AUTO: 95.2 FL (ref 80–99.9)
METER GLUCOSE: 162 MG/DL (ref 74–99)
METER GLUCOSE: 189 MG/DL (ref 74–99)
METER GLUCOSE: 203 MG/DL (ref 74–99)
METER GLUCOSE: 407 MG/DL (ref 74–99)
MONOCYTES ABSOLUTE: 1.18 E9/L (ref 0.1–0.95)
MONOCYTES RELATIVE PERCENT: 8.6 % (ref 2–12)
NEUTROPHILS ABSOLUTE: 10.52 E9/L (ref 1.8–7.3)
NEUTROPHILS RELATIVE PERCENT: 76.1 % (ref 43–80)
PDW BLD-RTO: 12.5 FL (ref 11.5–15)
PLATELET # BLD: 217 E9/L (ref 130–450)
PMV BLD AUTO: 10.3 FL (ref 7–12)
RBC # BLD: 4.39 E12/L (ref 3.8–5.8)
WBC # BLD: 13.8 E9/L (ref 4.5–11.5)

## 2022-02-26 PROCEDURE — S5553 INSULIN LONG ACTING 5 U: HCPCS | Performed by: INTERNAL MEDICINE

## 2022-02-26 PROCEDURE — 2140000000 HC CCU INTERMEDIATE R&B

## 2022-02-26 PROCEDURE — 6360000002 HC RX W HCPCS: Performed by: INTERNAL MEDICINE

## 2022-02-26 PROCEDURE — 82962 GLUCOSE BLOOD TEST: CPT

## 2022-02-26 PROCEDURE — 2580000003 HC RX 258: Performed by: INTERNAL MEDICINE

## 2022-02-26 PROCEDURE — 36415 COLL VENOUS BLD VENIPUNCTURE: CPT

## 2022-02-26 PROCEDURE — 6370000000 HC RX 637 (ALT 250 FOR IP): Performed by: INTERNAL MEDICINE

## 2022-02-26 PROCEDURE — 85025 COMPLETE CBC W/AUTO DIFF WBC: CPT

## 2022-02-26 PROCEDURE — 2500000003 HC RX 250 WO HCPCS: Performed by: INTERNAL MEDICINE

## 2022-02-26 RX ADMIN — Medication 10 ML: at 09:31

## 2022-02-26 RX ADMIN — INSULIN LISPRO 1 UNITS: 100 INJECTION, SOLUTION INTRAVENOUS; SUBCUTANEOUS at 21:09

## 2022-02-26 RX ADMIN — Medication 2000 MG: at 02:38

## 2022-02-26 RX ADMIN — AMLODIPINE BESYLATE 5 MG: 5 TABLET ORAL at 09:31

## 2022-02-26 RX ADMIN — Medication 2000 MG: at 13:23

## 2022-02-26 RX ADMIN — LISINOPRIL 10 MG: 5 TABLET ORAL at 09:31

## 2022-02-26 RX ADMIN — INSULIN LISPRO 4 UNITS: 100 INJECTION, SOLUTION INTRAVENOUS; SUBCUTANEOUS at 17:26

## 2022-02-26 RX ADMIN — INSULIN LISPRO 9 UNITS: 100 INJECTION, SOLUTION INTRAVENOUS; SUBCUTANEOUS at 17:27

## 2022-02-26 RX ADMIN — INSULIN LISPRO 12 UNITS: 100 INJECTION, SOLUTION INTRAVENOUS; SUBCUTANEOUS at 13:22

## 2022-02-26 RX ADMIN — Medication 2000 MG: at 19:34

## 2022-02-26 RX ADMIN — INSULIN LISPRO 9 UNITS: 100 INJECTION, SOLUTION INTRAVENOUS; SUBCUTANEOUS at 09:32

## 2022-02-26 RX ADMIN — HYDROCODONE BITARTRATE AND ACETAMINOPHEN 1 TABLET: 5; 325 TABLET ORAL at 14:24

## 2022-02-26 RX ADMIN — Medication 10 ML: at 21:10

## 2022-02-26 RX ADMIN — HYDROCODONE BITARTRATE AND ACETAMINOPHEN 1 TABLET: 5; 325 TABLET ORAL at 21:10

## 2022-02-26 RX ADMIN — INSULIN LISPRO 9 UNITS: 100 INJECTION, SOLUTION INTRAVENOUS; SUBCUTANEOUS at 13:22

## 2022-02-26 RX ADMIN — METFORMIN HYDROCHLORIDE 1000 MG: 500 TABLET, FILM COATED ORAL at 16:49

## 2022-02-26 RX ADMIN — VANCOMYCIN HYDROCHLORIDE 1000 MG: 1 INJECTION, POWDER, LYOPHILIZED, FOR SOLUTION INTRAVENOUS at 09:31

## 2022-02-26 RX ADMIN — INSULIN LISPRO 2 UNITS: 100 INJECTION, SOLUTION INTRAVENOUS; SUBCUTANEOUS at 09:45

## 2022-02-26 RX ADMIN — ATORVASTATIN CALCIUM 40 MG: 40 TABLET, FILM COATED ORAL at 09:31

## 2022-02-26 RX ADMIN — INSULIN GLARGINE-YFGN 50 UNITS: 100 INJECTION, SOLUTION SUBCUTANEOUS at 09:32

## 2022-02-26 RX ADMIN — FLUCONAZOLE 200 MG: 100 TABLET ORAL at 09:30

## 2022-02-26 RX ADMIN — METFORMIN HYDROCHLORIDE 1000 MG: 500 TABLET, FILM COATED ORAL at 09:31

## 2022-02-26 ASSESSMENT — PAIN DESCRIPTION - ORIENTATION: ORIENTATION: LEFT;LOWER

## 2022-02-26 ASSESSMENT — PAIN DESCRIPTION - ONSET: ONSET: ON-GOING

## 2022-02-26 ASSESSMENT — PAIN DESCRIPTION - PAIN TYPE: TYPE: ACUTE PAIN

## 2022-02-26 ASSESSMENT — PAIN DESCRIPTION - LOCATION: LOCATION: LEG

## 2022-02-26 ASSESSMENT — PAIN DESCRIPTION - FREQUENCY: FREQUENCY: CONTINUOUS

## 2022-02-26 ASSESSMENT — PAIN SCALES - GENERAL
PAINLEVEL_OUTOF10: 3
PAINLEVEL_OUTOF10: 0
PAINLEVEL_OUTOF10: 0
PAINLEVEL_OUTOF10: 7
PAINLEVEL_OUTOF10: 2
PAINLEVEL_OUTOF10: 8

## 2022-02-26 ASSESSMENT — PAIN DESCRIPTION - DESCRIPTORS: DESCRIPTORS: ACHING;CONSTANT

## 2022-02-26 NOTE — PROGRESS NOTES
Admit Date: 2/22/2022    Subjective:     Getting diarrhea     Objective:     Patient Vitals for the past 8 hrs:   BP Temp Temp src Pulse Resp SpO2   02/26/22 0929 (!) 160/66   65  99 %   02/26/22 0327 138/69 98.4 °F (36.9 °C) Oral 66 18 94 %     I/O last 3 completed shifts:  In: -   Out: 700 [Urine:700]  No intake/output data recorded. HEENT: Normal  NECK: Thyroid normal. No carotid bruit. No lymphphadenopathy. CVS: RRR  RS: Clear. No wheeze. No rhonchi. Good airflow bilaterally. ABD: Soft. Non tender. No mass. Normal BS.obese   EXT: No edema. Non tender.  Less erythema left leg   NEURO: no focal deficit       Scheduled Meds:   vancomycin  1,000 mg IntraVENous Q12H    lisinopril  10 mg Oral Daily    amLODIPine  5 mg Oral Daily    metFORMIN  1,000 mg Oral BID WC    atorvastatin  40 mg Oral Daily    ceFAZolin  2,000 mg IntraVENous Q8H    fluconazole  200 mg Oral Daily    insulin glargine  50 Units SubCUTAneous Daily    And    insulin lispro  9 Units SubCUTAneous TID WC    sodium chloride flush  5-40 mL IntraVENous 2 times per day    insulin lispro  0-12 Units SubCUTAneous TID WC    insulin lispro  0-6 Units SubCUTAneous Nightly     Continuous Infusions:   dextrose      sodium chloride         CBC with Differential:    Lab Results   Component Value Date    WBC 13.8 02/26/2022    RBC 4.39 02/26/2022    HGB 13.3 02/26/2022    HCT 41.8 02/26/2022     02/26/2022    MCV 95.2 02/26/2022    MCH 30.3 02/26/2022    MCHC 31.8 02/26/2022    RDW 12.5 02/26/2022    LYMPHOPCT 13.8 02/26/2022    MONOPCT 8.6 02/26/2022    BASOPCT 0.4 02/26/2022    MONOSABS 1.18 02/26/2022    LYMPHSABS 1.90 02/26/2022    EOSABS 0.04 02/26/2022    BASOSABS 0.05 02/26/2022     CMP:    Lab Results   Component Value Date     02/24/2022    K 4.2 02/24/2022     02/24/2022    CO2 25 02/24/2022    BUN 28 02/24/2022    CREATININE 1.1 02/24/2022    GFRAA >60 02/24/2022    LABGLOM >60 02/24/2022    PROT 7.9 02/22/2022 LABALBU 3.8 02/22/2022    CALCIUM 9.0 02/24/2022    BILITOT 0.4 02/22/2022    ALKPHOS 121 02/22/2022    AST 22 02/22/2022    ALT 30 02/22/2022     Blood culture staph epi. Assessment:     Principal Problem:  Cellulitis left leg   Staph. Epi  bacteremia   Leukocytosis due to above  Mycotic toe nails   DM  HTN      Plan:    Will get ID opinion

## 2022-02-26 NOTE — CONSULTS
Nightly     Continuous Infusions:   dextrose      sodium chloride       PRN Meds:HYDROcodone-acetaminophen, glucose, dextrose, glucagon (rDNA), dextrose, sodium chloride flush, sodium chloride    Allergies:  Patient has no known allergies. Social History:   Social History     Socioeconomic History    Marital status:      Spouse name: None    Number of children: None    Years of education: None    Highest education level: None   Occupational History    None   Tobacco Use    Smoking status: Former Smoker     Packs/day: 1.00     Years: 30.00     Pack years: 30.00     Types: Pipe     Start date: 45     Quit date: 2014     Years since quittin.4    Smokeless tobacco: Never Used    Tobacco comment: pipe once daily   Substance and Sexual Activity    Alcohol use: No    Drug use: No    Sexual activity: Not Currently   Other Topics Concern    None   Social History Narrative    None     Social Determinants of Health     Financial Resource Strain: Low Risk     Difficulty of Paying Living Expenses: Not hard at all   Food Insecurity: No Food Insecurity    Worried About Running Out of Food in the Last Year: Never true    Donna of Food in the Last Year: Never true   Transportation Needs:     Lack of Transportation (Medical): Not on file    Lack of Transportation (Non-Medical):  Not on file   Physical Activity:     Days of Exercise per Week: Not on file    Minutes of Exercise per Session: Not on file   Stress:     Feeling of Stress : Not on file   Social Connections:     Frequency of Communication with Friends and Family: Not on file    Frequency of Social Gatherings with Friends and Family: Not on file    Attends Jehovah's witness Services: Not on file    Active Member of Clubs or Organizations: Not on file    Attends Club or Organization Meetings: Not on file    Marital Status: Not on file   Intimate Partner Violence:     Fear of Current or Ex-Partner: Not on file    Emotionally Abused: Not on file    Physically Abused: Not on file    Sexually Abused: Not on file   Housing Stability:     Unable to Pay for Housing in the Last Year: Not on file    Number of Places Lived in the Last Year: Not on file    Unstable Housing in the Last Year: Not on file     Family History:       Problem Relation Age of Onset    High Blood Pressure Father     Cancer Brother    . Otherwise non-pertinent to the chief complaint. REVIEW OF SYSTEMS:    As mentioned in HPI, all other systems negative. PHYSICAL EXAM:    Vitals:    BP (!) 160/66   Pulse 65   Temp 98.4 °F (36.9 °C) (Oral)   Resp 18   Ht 5' 10\" (1.778 m)   Wt 255 lb 11.7 oz (116 kg)   SpO2 99%   BMI 36.69 kg/m²   Constitutional: The patient is awake, alert, and oriented. Skin: Warm and dry. HEENT: Eyes show round, and reactive pupils. Moist mucous membranes, no ulcerations, no thrush. Neck: Supple to movements. No lymphadenopathy. Chest: No use of accessory muscles to breathe. Symmetrical expansion. Auscultation reveals no wheezing, crackles, or rhonchi. Cardiovascular: S1 and S2 are rhythmic and regular. No murmurs appreciated. Abdomen: Positive bowel sounds to auscultation. Benign to palpation. No masses felt. No hepatosplenomegaly. Extremities: No clubbing, no cyanosis,left LE : cellulitis  Musculoskeletal: left LE swollen compared to right LE  Neurological: alert, oriented x 3, no gross focal abnormalities  Lines: peripheral      CBC+dif:  Recent Labs     02/24/22  0659 02/24/22  0659 02/26/22  0513   WBC 14.3*  --  13.8*   HGB 13.4   < > 13.3   HCT 40.9   < > 41.8   MCV 91.7   < > 95.2      < > 217   NEUTROABS 11.42*   < > 10.52*    < > = values in this interval not displayed.      No results found for: CRP  No results found for: CRPHS  No results found for: SEDRATE  Lab Results   Component Value Date    ALT 30 02/22/2022    AST 22 02/22/2022    ALKPHOS 121 02/22/2022    BILITOT 0.4 02/22/2022     Lab Results Component Value Date     02/24/2022    K 4.2 02/24/2022     02/24/2022    CO2 25 02/24/2022    BUN 28 02/24/2022    CREATININE 1.1 02/24/2022    GFRAA >60 02/24/2022    LABGLOM >60 02/24/2022    GLUCOSE 163 02/24/2022    PROT 7.9 02/22/2022    LABALBU 3.8 02/22/2022    CALCIUM 9.0 02/24/2022    BILITOT 0.4 02/22/2022    ALKPHOS 121 02/22/2022    AST 22 02/22/2022    ALT 30 02/22/2022       No results found for: PROTIME, INR    No results found for: TSH    Lab Results   Component Value Date    COLORU Yellow 10/27/2021    PHUR 5.5 10/27/2021    WBCUA NONE 10/27/2021    RBCUA NONE 10/27/2021    MUCUS Present 04/30/2021    BACTERIA NONE SEEN 10/27/2021    CLARITYU Clear 10/27/2021    SPECGRAV 1.025 10/27/2021    LEUKOCYTESUR Negative 10/27/2021    UROBILINOGEN 0.2 10/27/2021    BILIRUBINUR Negative 10/27/2021    BLOODU Negative 10/27/2021    GLUCOSEU 100 10/27/2021       No results found for: TPL8ZGC, BEART, E7TFDGDV, PHART, THGBART, ABW9KTH, PO2ART, KGG6JGV  Radiology:  XR CHEST PORTABLE   Final Result   No acute process. US DUP LOWER EXTREMITY LEFT RAMIREZ   Final Result   Within the visualized vessels there is no evidence for deep venous   thrombosis                   Microbiology:  Pending  No results for input(s): BC in the last 72 hours. No results for input(s): ORG in the last 72 hours. No results for input(s): Catalino Points in the last 72 hours. No results for input(s): STREPNEUMAGU in the last 72 hours. No results for input(s): LP1UAG in the last 72 hours. No results for input(s): ASO in the last 72 hours. No results for input(s): CULTRESP in the last 72 hours. Assessment:  · Left LE non purulent cellulitis: on cefazolin. Per patient it does not seem any better. No fever, leucocytosis is getting better  · Contamination of blood cx: one is Staph epidermidis, one is staph capitis.  They are contaminants  · Leucocytosis from above    Plan:    · Continue cefazolin 2gr IV q8  · Please ace wrap left LE and elevate it  · Stop vancomycin/fluconazole. · Will follow with you    Thank you for having us see this patient in consultation. I will be discussing this case with the treating physicians.     Electronically signed by Lani Duverney, MD on 2/26/2022 at 11:03 AM

## 2022-02-26 NOTE — PLAN OF CARE
Problem: Pain:  Goal: Pain level will decrease  Description: Pain level will decrease  2/26/2022 1159 by Mina Washington RN  Outcome: Met This Shift  2/26/2022 1159 by Mina Washington RN  Outcome: Met This Shift  Goal: Control of acute pain  Description: Control of acute pain  2/26/2022 1159 by Mina Washington RN  Outcome: Met This Shift  2/26/2022 1159 by Mina Washington RN  Outcome: Met This Shift  Goal: Control of chronic pain  Description: Control of chronic pain  2/26/2022 1159 by Mina Washington RN  Outcome: Met This Shift  2/26/2022 1159 by Mina Washington RN  Outcome: Met This Shift

## 2022-02-27 ENCOUNTER — APPOINTMENT (OUTPATIENT)
Dept: CT IMAGING | Age: 77
DRG: 603 | End: 2022-02-27
Payer: MEDICARE

## 2022-02-27 LAB
METER GLUCOSE: 109 MG/DL (ref 74–99)
METER GLUCOSE: 163 MG/DL (ref 74–99)
METER GLUCOSE: 310 MG/DL (ref 74–99)

## 2022-02-27 PROCEDURE — 6360000002 HC RX W HCPCS: Performed by: INTERNAL MEDICINE

## 2022-02-27 PROCEDURE — 2500000003 HC RX 250 WO HCPCS: Performed by: INTERNAL MEDICINE

## 2022-02-27 PROCEDURE — 2140000000 HC CCU INTERMEDIATE R&B

## 2022-02-27 PROCEDURE — 6370000000 HC RX 637 (ALT 250 FOR IP): Performed by: INTERNAL MEDICINE

## 2022-02-27 PROCEDURE — 82962 GLUCOSE BLOOD TEST: CPT

## 2022-02-27 PROCEDURE — 2580000003 HC RX 258: Performed by: INTERNAL MEDICINE

## 2022-02-27 PROCEDURE — 73700 CT LOWER EXTREMITY W/O DYE: CPT

## 2022-02-27 PROCEDURE — S5553 INSULIN LONG ACTING 5 U: HCPCS | Performed by: INTERNAL MEDICINE

## 2022-02-27 RX ADMIN — INSULIN LISPRO 2 UNITS: 100 INJECTION, SOLUTION INTRAVENOUS; SUBCUTANEOUS at 08:56

## 2022-02-27 RX ADMIN — METFORMIN HYDROCHLORIDE 1000 MG: 500 TABLET, FILM COATED ORAL at 17:27

## 2022-02-27 RX ADMIN — INSULIN GLARGINE-YFGN 50 UNITS: 100 INJECTION, SOLUTION SUBCUTANEOUS at 08:55

## 2022-02-27 RX ADMIN — ATORVASTATIN CALCIUM 40 MG: 40 TABLET, FILM COATED ORAL at 09:00

## 2022-02-27 RX ADMIN — METFORMIN HYDROCHLORIDE 1000 MG: 500 TABLET, FILM COATED ORAL at 09:00

## 2022-02-27 RX ADMIN — HYDROCODONE BITARTRATE AND ACETAMINOPHEN 1 TABLET: 5; 325 TABLET ORAL at 12:03

## 2022-02-27 RX ADMIN — INSULIN LISPRO 9 UNITS: 100 INJECTION, SOLUTION INTRAVENOUS; SUBCUTANEOUS at 17:31

## 2022-02-27 RX ADMIN — Medication 2000 MG: at 03:05

## 2022-02-27 RX ADMIN — HYDROCODONE BITARTRATE AND ACETAMINOPHEN 1 TABLET: 5; 325 TABLET ORAL at 23:19

## 2022-02-27 RX ADMIN — AMLODIPINE BESYLATE 5 MG: 5 TABLET ORAL at 09:00

## 2022-02-27 RX ADMIN — LISINOPRIL 10 MG: 5 TABLET ORAL at 09:00

## 2022-02-27 RX ADMIN — INSULIN LISPRO 9 UNITS: 100 INJECTION, SOLUTION INTRAVENOUS; SUBCUTANEOUS at 12:03

## 2022-02-27 RX ADMIN — INSULIN LISPRO 9 UNITS: 100 INJECTION, SOLUTION INTRAVENOUS; SUBCUTANEOUS at 08:56

## 2022-02-27 RX ADMIN — Medication 2000 MG: at 22:00

## 2022-02-27 RX ADMIN — INSULIN LISPRO 8 UNITS: 100 INJECTION, SOLUTION INTRAVENOUS; SUBCUTANEOUS at 12:14

## 2022-02-27 RX ADMIN — HYDROCODONE BITARTRATE AND ACETAMINOPHEN 1 TABLET: 5; 325 TABLET ORAL at 06:06

## 2022-02-27 RX ADMIN — Medication 10 ML: at 23:13

## 2022-02-27 RX ADMIN — Medication 2000 MG: at 12:03

## 2022-02-27 RX ADMIN — HYDROCODONE BITARTRATE AND ACETAMINOPHEN 1 TABLET: 5; 325 TABLET ORAL at 17:27

## 2022-02-27 RX ADMIN — Medication 10 ML: at 09:00

## 2022-02-27 RX ADMIN — INSULIN LISPRO 2 UNITS: 100 INJECTION, SOLUTION INTRAVENOUS; SUBCUTANEOUS at 17:30

## 2022-02-27 ASSESSMENT — PAIN SCALES - GENERAL
PAINLEVEL_OUTOF10: 6
PAINLEVEL_OUTOF10: 10
PAINLEVEL_OUTOF10: 6
PAINLEVEL_OUTOF10: 8
PAINLEVEL_OUTOF10: 4
PAINLEVEL_OUTOF10: 10
PAINLEVEL_OUTOF10: 0
PAINLEVEL_OUTOF10: 6

## 2022-02-27 ASSESSMENT — PAIN DESCRIPTION - PAIN TYPE: TYPE: ACUTE PAIN

## 2022-02-27 ASSESSMENT — PAIN DESCRIPTION - LOCATION: LOCATION: FLANK;LEG

## 2022-02-27 NOTE — PROGRESS NOTES
Admit Date: 2/22/2022    Subjective:     Feels fine   ID consult appreciated     Objective:     Patient Vitals for the past 8 hrs:   BP Temp Temp src Pulse Resp SpO2   02/27/22 0820 (!) 146/74 98.1 °F (36.7 °C) Oral 59  97 %   02/27/22 0307 (!) 161/76 98 °F (36.7 °C) Oral 80 16 96 %     I/O last 3 completed shifts: In: 640 [P.O.:640]  Out: 900 [Urine:900]  No intake/output data recorded. HEENT: Normal  NECK: Thyroid normal. No carotid bruit. No lymphphadenopathy. CVS: RRR  RS: Clear. No wheeze. No rhonchi. Good airflow bilaterally. ABD: Soft. Non tender. No mass. Normal BS. EXT: No edema. Non tender. dressing left leg  NEURO: Intact      Scheduled Meds:   lisinopril  10 mg Oral Daily    amLODIPine  5 mg Oral Daily    metFORMIN  1,000 mg Oral BID WC    atorvastatin  40 mg Oral Daily    ceFAZolin  2,000 mg IntraVENous Q8H    insulin glargine  50 Units SubCUTAneous Daily    And    insulin lispro  9 Units SubCUTAneous TID WC    sodium chloride flush  5-40 mL IntraVENous 2 times per day    insulin lispro  0-12 Units SubCUTAneous TID WC    insulin lispro  0-6 Units SubCUTAneous Nightly     Continuous Infusions:   dextrose      sodium chloride         CBC with Differential:    Lab Results   Component Value Date    WBC 13.8 02/26/2022    RBC 4.39 02/26/2022    HGB 13.3 02/26/2022    HCT 41.8 02/26/2022     02/26/2022    MCV 95.2 02/26/2022    MCH 30.3 02/26/2022    MCHC 31.8 02/26/2022    RDW 12.5 02/26/2022    LYMPHOPCT 13.8 02/26/2022    MONOPCT 8.6 02/26/2022    BASOPCT 0.4 02/26/2022    MONOSABS 1.18 02/26/2022    LYMPHSABS 1.90 02/26/2022    EOSABS 0.04 02/26/2022    BASOSABS 0.05 02/26/2022     CMP:    Lab Results   Component Value Date     02/24/2022    K 4.2 02/24/2022     02/24/2022    CO2 25 02/24/2022    BUN 28 02/24/2022    CREATININE 1.1 02/24/2022    GFRAA >60 02/24/2022    LABGLOM >60 02/24/2022    PROT 7.9 02/22/2022    LABALBU 3.8 02/22/2022    CALCIUM 9.0 02/24/2022 BILITOT 0.4 02/22/2022    ALKPHOS 121 02/22/2022    AST 22 02/22/2022    ALT 30 02/22/2022     PT/INR:  No results found for: PROTIME, INR    Assessment:     Principal Problem:   Cellulitis left leg   Leukocytosis due to above  Mycotic toe nails   DM  HTN      Plan:   Continue ATB per ID

## 2022-02-27 NOTE — PROGRESS NOTES
Infectious Disease  Progress Note  [facility]    Chief Complaint: left LE cellulitis    Subjective:  He thinks its not improving and pain is no better. No fever. Tolerating abx well. Scheduled Meds:   lisinopril  10 mg Oral Daily    amLODIPine  5 mg Oral Daily    metFORMIN  1,000 mg Oral BID WC    atorvastatin  40 mg Oral Daily    ceFAZolin  2,000 mg IntraVENous Q8H    insulin glargine  50 Units SubCUTAneous Daily    And    insulin lispro  9 Units SubCUTAneous TID WC    sodium chloride flush  5-40 mL IntraVENous 2 times per day    insulin lispro  0-12 Units SubCUTAneous TID WC    insulin lispro  0-6 Units SubCUTAneous Nightly     Continuous Infusions:   dextrose      sodium chloride       PRN Meds:HYDROcodone-acetaminophen, glucose, dextrose, glucagon (rDNA), dextrose, sodium chloride flush, sodium chloride    ROS:  As mentioned in subjective, all other systems negative      BP (!) 146/74   Pulse 59   Temp 98.1 °F (36.7 °C) (Oral)   Resp 16   Ht 5' 10\" (1.778 m)   Wt 255 lb 11.7 oz (116 kg)   SpO2 97%   BMI 36.69 kg/m²     Physical Exam  Const/Neuro- unchanged, no signs of acute distress, Alert  ENMT- Within Normal Limits, Normocephalic, mucous membranes pink/moist, No thrush  Neck: Neck supple  Heart- Regular, Rate, Rhythm- no murmur appreciated. Lungs- clear to ascultation. Respirations even and nonlabored. Abdomen- Soft, bowel sounds positive, non tender  Musculo/Extremities-  Equal and symmetrical, left LE edema and redness and tenderness +  Neurological- No focal motor or sensory loss. No confusion  Skin:  Warm and dry, free from rashes. Labs, Cultures reviewed  Radiology reviewed    Microbiology:   Blood cx 2/22: Staph epi and Staph capitis      Assessment:  · Left LE non purulent cellulitis: on cefazolin. No fever, leucocytosis is getting better  · Contamination of blood cx: one is Staph epidermidis, one is staph capitis.  They are contaminants  · Leucocytosis from above: improving     Plan:    · Continue cefazolin 2gr IV q8  · CT Tibia fibula looking for an abscess underneath as he has significant amount of pain at the site.   · Will follow with you     Electronically signed by Criss Londono MD on 2/27/2022 at 9:29 AM

## 2022-02-28 LAB
CULTURE, BLOOD 2: ABNORMAL
METER GLUCOSE: 116 MG/DL (ref 74–99)
METER GLUCOSE: 142 MG/DL (ref 74–99)
METER GLUCOSE: 149 MG/DL (ref 74–99)
METER GLUCOSE: 270 MG/DL (ref 74–99)
ORGANISM: ABNORMAL

## 2022-02-28 PROCEDURE — 6370000000 HC RX 637 (ALT 250 FOR IP): Performed by: INTERNAL MEDICINE

## 2022-02-28 PROCEDURE — 6360000002 HC RX W HCPCS: Performed by: INTERNAL MEDICINE

## 2022-02-28 PROCEDURE — S5553 INSULIN LONG ACTING 5 U: HCPCS | Performed by: INTERNAL MEDICINE

## 2022-02-28 PROCEDURE — 2580000003 HC RX 258: Performed by: INTERNAL MEDICINE

## 2022-02-28 PROCEDURE — 82962 GLUCOSE BLOOD TEST: CPT

## 2022-02-28 PROCEDURE — 2500000003 HC RX 250 WO HCPCS: Performed by: INTERNAL MEDICINE

## 2022-02-28 PROCEDURE — 2140000000 HC CCU INTERMEDIATE R&B

## 2022-02-28 RX ADMIN — INSULIN LISPRO 9 UNITS: 100 INJECTION, SOLUTION INTRAVENOUS; SUBCUTANEOUS at 12:00

## 2022-02-28 RX ADMIN — HYDROCODONE BITARTRATE AND ACETAMINOPHEN 1 TABLET: 5; 325 TABLET ORAL at 21:18

## 2022-02-28 RX ADMIN — LISINOPRIL 10 MG: 5 TABLET ORAL at 08:57

## 2022-02-28 RX ADMIN — Medication 10 ML: at 21:23

## 2022-02-28 RX ADMIN — METFORMIN HYDROCHLORIDE 1000 MG: 500 TABLET, FILM COATED ORAL at 09:00

## 2022-02-28 RX ADMIN — INSULIN LISPRO 1 UNITS: 100 INJECTION, SOLUTION INTRAVENOUS; SUBCUTANEOUS at 21:19

## 2022-02-28 RX ADMIN — Medication 2000 MG: at 21:18

## 2022-02-28 RX ADMIN — INSULIN LISPRO 2 UNITS: 100 INJECTION, SOLUTION INTRAVENOUS; SUBCUTANEOUS at 17:22

## 2022-02-28 RX ADMIN — INSULIN LISPRO 9 UNITS: 100 INJECTION, SOLUTION INTRAVENOUS; SUBCUTANEOUS at 17:23

## 2022-02-28 RX ADMIN — METFORMIN HYDROCHLORIDE 1000 MG: 500 TABLET, FILM COATED ORAL at 17:44

## 2022-02-28 RX ADMIN — AMLODIPINE BESYLATE 5 MG: 5 TABLET ORAL at 08:57

## 2022-02-28 RX ADMIN — INSULIN LISPRO 9 UNITS: 100 INJECTION, SOLUTION INTRAVENOUS; SUBCUTANEOUS at 09:02

## 2022-02-28 RX ADMIN — Medication 2000 MG: at 11:00

## 2022-02-28 RX ADMIN — HYDROCODONE BITARTRATE AND ACETAMINOPHEN 1 TABLET: 5; 325 TABLET ORAL at 08:57

## 2022-02-28 RX ADMIN — Medication 2000 MG: at 03:00

## 2022-02-28 RX ADMIN — INSULIN GLARGINE-YFGN 50 UNITS: 100 INJECTION, SOLUTION SUBCUTANEOUS at 09:02

## 2022-02-28 RX ADMIN — Medication 10 ML: at 09:01

## 2022-02-28 RX ADMIN — INSULIN LISPRO 6 UNITS: 100 INJECTION, SOLUTION INTRAVENOUS; SUBCUTANEOUS at 12:00

## 2022-02-28 RX ADMIN — ATORVASTATIN CALCIUM 40 MG: 40 TABLET, FILM COATED ORAL at 08:57

## 2022-02-28 ASSESSMENT — PAIN SCALES - GENERAL
PAINLEVEL_OUTOF10: 6
PAINLEVEL_OUTOF10: 0
PAINLEVEL_OUTOF10: 8
PAINLEVEL_OUTOF10: 8
PAINLEVEL_OUTOF10: 4

## 2022-02-28 NOTE — PROGRESS NOTES
Admit Date: 2/22/2022    Subjective:     Doing fine no new event     Objective:     No data found. I/O last 3 completed shifts: In: 1120 [P.O.:1120]  Out: 500 [Urine:500]  No intake/output data recorded. HEENT: Normal  NECK: Thyroid normal. No carotid bruit. No lymphphadenopathy. CVS: RRR  RS: Clear. No wheeze. No rhonchi. Good airflow bilaterally. ABD: Soft. Non tender. No mass. Normal BS. EXT: No edema. Non tender.  Dressing left foot leg   NEURO:no focal deficit       Scheduled Meds:   lisinopril  10 mg Oral Daily    amLODIPine  5 mg Oral Daily    metFORMIN  1,000 mg Oral BID WC    atorvastatin  40 mg Oral Daily    ceFAZolin  2,000 mg IntraVENous Q8H    insulin glargine  50 Units SubCUTAneous Daily    And    insulin lispro  9 Units SubCUTAneous TID WC    sodium chloride flush  5-40 mL IntraVENous 2 times per day    insulin lispro  0-12 Units SubCUTAneous TID WC    insulin lispro  0-6 Units SubCUTAneous Nightly     Continuous Infusions:   dextrose      sodium chloride         CBC with Differential:    Lab Results   Component Value Date    WBC 13.8 02/26/2022    RBC 4.39 02/26/2022    HGB 13.3 02/26/2022    HCT 41.8 02/26/2022     02/26/2022    MCV 95.2 02/26/2022    MCH 30.3 02/26/2022    MCHC 31.8 02/26/2022    RDW 12.5 02/26/2022    LYMPHOPCT 13.8 02/26/2022    MONOPCT 8.6 02/26/2022    BASOPCT 0.4 02/26/2022    MONOSABS 1.18 02/26/2022    LYMPHSABS 1.90 02/26/2022    EOSABS 0.04 02/26/2022    BASOSABS 0.05 02/26/2022     CMP:    Lab Results   Component Value Date     02/24/2022    K 4.2 02/24/2022     02/24/2022    CO2 25 02/24/2022    BUN 28 02/24/2022    CREATININE 1.1 02/24/2022    GFRAA >60 02/24/2022    LABGLOM >60 02/24/2022    PROT 7.9 02/22/2022    LABALBU 3.8 02/22/2022    CALCIUM 9.0 02/24/2022    BILITOT 0.4 02/22/2022    ALKPHOS 121 02/22/2022    AST 22 02/22/2022    ALT 30 02/22/2022     Ct scan noted     Assessment:     Principal Problem:  Cellulitis left leg poss.  Abscess   Leukocytosis due to above  Mycotic toe nails   DM  HTN      Plan:   Continue ATB per ID

## 2022-02-28 NOTE — PLAN OF CARE
Problem: Pain:  Goal: Pain level will decrease  Description: Pain level will decrease  Outcome: Ongoing  Goal: Control of acute pain  Description: Control of acute pain  Outcome: Ongoing  Goal: Control of chronic pain  Description: Control of chronic pain  Outcome: Ongoing     Problem: Discharge Planning:  Goal: Discharged to appropriate level of care  Description: Discharged to appropriate level of care  Outcome: Ongoing     Problem:  Body Temperature - Imbalanced:  Goal: Ability to maintain a body temperature in the normal range will improve  Description: Ability to maintain a body temperature in the normal range will improve  Outcome: Ongoing     Problem: Mobility - Impaired:  Goal: Mobility will improve to maximum level  Description: Mobility will improve to maximum level  Outcome: Ongoing     Problem: Pain:  Goal: Pain level will decrease  Description: Pain level will decrease  Outcome: Ongoing  Goal: Control of acute pain  Description: Control of acute pain  Outcome: Ongoing  Goal: Control of chronic pain  Description: Control of chronic pain  Outcome: Ongoing     Problem: Skin Integrity - Impaired:  Goal: Will show no infection signs and symptoms  Description: Will show no infection signs and symptoms  Outcome: Ongoing  Goal: Absence of new skin breakdown  Description: Absence of new skin breakdown  Outcome: Ongoing

## 2022-02-28 NOTE — PROGRESS NOTES
Infectious Disease  Progress Note  [facility]    Chief Complaint: left LE cellulitis    Subjective:  He thinks its progressing up his leg. No fever. Tolerating abx well. Scheduled Meds:   lisinopril  10 mg Oral Daily    amLODIPine  5 mg Oral Daily    metFORMIN  1,000 mg Oral BID WC    atorvastatin  40 mg Oral Daily    ceFAZolin  2,000 mg IntraVENous Q8H    insulin glargine  50 Units SubCUTAneous Daily    And    insulin lispro  9 Units SubCUTAneous TID WC    sodium chloride flush  5-40 mL IntraVENous 2 times per day    insulin lispro  0-12 Units SubCUTAneous TID WC    insulin lispro  0-6 Units SubCUTAneous Nightly     Continuous Infusions:   dextrose      sodium chloride       PRN Meds:HYDROcodone-acetaminophen, glucose, dextrose, glucagon (rDNA), dextrose, sodium chloride flush, sodium chloride    ROS:  As mentioned in subjective, all other systems negative      BP (!) 144/58   Pulse 62   Temp 98.1 °F (36.7 °C) (Oral)   Resp 17   Ht 5' 10\" (1.778 m)   Wt 255 lb 11.7 oz (116 kg)   SpO2 98%   BMI 36.69 kg/m²     Physical Exam  Const/Neuro- unchanged, no signs of acute distress, Alert  ENMT- Within Normal Limits, Normocephalic, mucous membranes pink/moist, No thrush  Neck: Neck supple  Heart- Regular, Rate, Rhythm- no murmur appreciated. Lungs- clear to ascultation. Respirations even and nonlabored. Abdomen- Soft, bowel sounds positive, non tender  Musculo/Extremities-  Equal and symmetrical, left LE edema and redness and tenderness +: seems to be better compared to yesterday. Neurological- No focal motor or sensory loss. No confusion  Skin:  Warm and dry, free from rashes.       Labs, Cultures reviewed  Radiology reviewed    Microbiology:   Blood cx 2/22: Staph epi and Staph capitis    CT left leg:   Diffuse subcutaneous soft tissue edema throughout the left lower   extremity.  Rule out cellulitis.       2.  Questionable primitive compartmentalization of a small amount of fluid in   the posterolateral aspect of the ankle within the subcutaneous soft tissues. No further evaluation can be made without IV contrast.  A poorly formed fluid   collection/abscess cannot be entirely excluded, however, it may just very   well represent part of the generalized soft tissue swelling.  CT scan or MRI   of the left lower extremity with IV contrast can be performed for further   evaluation.       3.  Diffuse calcification involving the SHANE, PTA, and peroneal arteries. Stenosis cannot be excluded.  CTA of the left lower extremity can be   considered, if clinically warranted.       4.  Osteo-degenerative changes and calcaneal bone spurs, as described above. Assessment:  · Left LE non purulent cellulitis: on cefazolin. No fever, leucocytosis is getting better  · Contamination of blood cx: one is Staph epidermidis, one is staph capitis. They are contaminants  · Leucocytosis from above: improving     Plan:    · Continue cefazolin 2gr IV q8.  · I think its improving clinically and will watch him on IV antibiotic one more day,.   · Will follow with you     Electronically signed by Ruchi Bonilla MD on 2/28/2022 at 1:12 PM

## 2022-03-01 VITALS
BODY MASS INDEX: 36.61 KG/M2 | HEART RATE: 65 BPM | TEMPERATURE: 97.9 F | RESPIRATION RATE: 20 BRPM | SYSTOLIC BLOOD PRESSURE: 143 MMHG | HEIGHT: 70 IN | DIASTOLIC BLOOD PRESSURE: 75 MMHG | OXYGEN SATURATION: 96 % | WEIGHT: 255.73 LBS

## 2022-03-01 LAB
METER GLUCOSE: 116 MG/DL (ref 74–99)
METER GLUCOSE: 183 MG/DL (ref 74–99)
ORGANISM: ABNORMAL

## 2022-03-01 PROCEDURE — 6370000000 HC RX 637 (ALT 250 FOR IP): Performed by: INTERNAL MEDICINE

## 2022-03-01 PROCEDURE — 2580000003 HC RX 258: Performed by: INTERNAL MEDICINE

## 2022-03-01 PROCEDURE — 2500000003 HC RX 250 WO HCPCS: Performed by: INTERNAL MEDICINE

## 2022-03-01 PROCEDURE — 82962 GLUCOSE BLOOD TEST: CPT

## 2022-03-01 PROCEDURE — 6360000002 HC RX W HCPCS: Performed by: INTERNAL MEDICINE

## 2022-03-01 PROCEDURE — S5553 INSULIN LONG ACTING 5 U: HCPCS | Performed by: INTERNAL MEDICINE

## 2022-03-01 RX ORDER — LISINOPRIL 10 MG/1
10 TABLET ORAL DAILY
Qty: 30 TABLET | Refills: 3 | Status: SHIPPED | OUTPATIENT
Start: 2022-03-02 | End: 2022-04-29 | Stop reason: SDUPTHER

## 2022-03-01 RX ORDER — CEPHALEXIN 500 MG/1
500 CAPSULE ORAL 4 TIMES DAILY
Qty: 28 CAPSULE | Refills: 0 | Status: SHIPPED | OUTPATIENT
Start: 2022-03-01 | End: 2022-03-08

## 2022-03-01 RX ADMIN — METFORMIN HYDROCHLORIDE 1000 MG: 500 TABLET, FILM COATED ORAL at 08:20

## 2022-03-01 RX ADMIN — INSULIN GLARGINE-YFGN 50 UNITS: 100 INJECTION, SOLUTION SUBCUTANEOUS at 08:30

## 2022-03-01 RX ADMIN — INSULIN LISPRO 9 UNITS: 100 INJECTION, SOLUTION INTRAVENOUS; SUBCUTANEOUS at 12:10

## 2022-03-01 RX ADMIN — INSULIN LISPRO 9 UNITS: 100 INJECTION, SOLUTION INTRAVENOUS; SUBCUTANEOUS at 08:30

## 2022-03-01 RX ADMIN — Medication 2000 MG: at 04:53

## 2022-03-01 RX ADMIN — Medication 2000 MG: at 12:10

## 2022-03-01 RX ADMIN — Medication 10 ML: at 08:21

## 2022-03-01 RX ADMIN — HYDROCODONE BITARTRATE AND ACETAMINOPHEN 1 TABLET: 5; 325 TABLET ORAL at 15:54

## 2022-03-01 RX ADMIN — INSULIN LISPRO 2 UNITS: 100 INJECTION, SOLUTION INTRAVENOUS; SUBCUTANEOUS at 12:10

## 2022-03-01 RX ADMIN — ATORVASTATIN CALCIUM 40 MG: 40 TABLET, FILM COATED ORAL at 08:21

## 2022-03-01 RX ADMIN — AMLODIPINE BESYLATE 5 MG: 5 TABLET ORAL at 08:20

## 2022-03-01 RX ADMIN — HYDROCODONE BITARTRATE AND ACETAMINOPHEN 1 TABLET: 5; 325 TABLET ORAL at 08:20

## 2022-03-01 RX ADMIN — LISINOPRIL 10 MG: 5 TABLET ORAL at 08:20

## 2022-03-01 ASSESSMENT — PAIN SCALES - GENERAL
PAINLEVEL_OUTOF10: 7
PAINLEVEL_OUTOF10: 8
PAINLEVEL_OUTOF10: 8

## 2022-03-01 NOTE — CARE COORDINATION
Per nursing rounds, patient continues on IV Cefazolin Q8 for left leg cellulitis. Wound care consulted for L lower leg. Stool cultures pending. Met with patient and wife at bedside for transition of care planning. Patient lives with wife in a home, is independent and ambulates without a device. No home DME, no hx or MANDY or home care. [facility]ses [facility]) and PCP is Dr. Austen Voss. Plan is home, no needs and wife to transport. Advance Care Planning   Healthcare Decision Maker:    Primary Decision Maker: Robyn Painting - [phone]    Secondary Decision Maker: Natanael Centeno  [place] - [phone]    Today we documented Decision Maker(s) consistent with Legal Next of Kin hierarchy.     Flor Meza MSW, LSW [phone]

## 2022-03-01 NOTE — FLOWSHEET NOTE
Inpatient Wound Care (Initial consult) [facility]10[facility]    Admit Date: 2/22/2022  9:39 PM    Reason for consult:  Left lower leg    Significant history:  Per H&P    Vena Ra a 67 y. o. male presenting to the ED for leg swelling, beginning days ago. He reports the swelling has been going on for last several days.  Patient reports the redness started today.  Patient reporting no fever no chills he is a diabetic. Found with swelling redness left leg and leukocytosis started on ATB and admitted for treatment     Findings:       03/01/22 0930   Skin Integrity   Skin Integrity   (redness, swelling, dry flaky, old healed area)   Location LLE   Skin Integrity Site 2   Skin Integrity Location 2 Bruising   Location 2 BUE       Impression: Skin assessment complete, see above documentation    Plan: Tubigrip to left leg: size F  Patient leg measured prior to applying tubigrip. Patient will need continued preventative care.       Francisco Dunbar RN 3/1/2022 10:40 AM

## 2022-03-01 NOTE — CARE COORDINATION
Care Coordination  The patients wife was asking for a wheeled walker for the patient and the dme order and facesheet and clinical was faxed to [facility] and I notified the patients wife it will most likely get delivered to the home tomorrow and I notified the patients wife about this.  I will follow

## 2022-03-01 NOTE — PROGRESS NOTES
CLINICAL PHARMACY NOTE: MEDS TO BEDS    Total # of Prescriptions Filled: 2   The following medications were delivered to the patient:  · Lisinopril 10 mg  · Cephalexin 500 mg    Additional Documentation:

## 2022-03-01 NOTE — PROGRESS NOTES
Admit Date: 2/22/2022    Subjective:     Feels better     Objective:     No data found. I/O last 3 completed shifts: In: 720 [P.O.:720]  Out: 600 [Urine:600]  No intake/output data recorded. HEENT: Normal  NECK: Thyroid normal. No carotid bruit. No lymphphadenopathy. CVS: RRR  RS: Clear. No wheeze. No rhonchi. Good airflow bilaterally. ABD: Soft. Non tender. No mass. Normal BS. EXT: No edema. Non tender.  Less erythema   NEURO: Intact      Scheduled Meds:   lisinopril  10 mg Oral Daily    amLODIPine  5 mg Oral Daily    metFORMIN  1,000 mg Oral BID WC    atorvastatin  40 mg Oral Daily    ceFAZolin  2,000 mg IntraVENous Q8H    insulin glargine  50 Units SubCUTAneous Daily    And    insulin lispro  9 Units SubCUTAneous TID WC    sodium chloride flush  5-40 mL IntraVENous 2 times per day    insulin lispro  0-12 Units SubCUTAneous TID WC    insulin lispro  0-6 Units SubCUTAneous Nightly     Continuous Infusions:   dextrose      sodium chloride         CBC with Differential:    Lab Results   Component Value Date    WBC 13.8 02/26/2022    RBC 4.39 02/26/2022    HGB 13.3 02/26/2022    HCT 41.8 02/26/2022     02/26/2022    MCV 95.2 02/26/2022    MCH 30.3 02/26/2022    MCHC 31.8 02/26/2022    RDW 12.5 02/26/2022    LYMPHOPCT 13.8 02/26/2022    MONOPCT 8.6 02/26/2022    BASOPCT 0.4 02/26/2022    MONOSABS 1.18 02/26/2022    LYMPHSABS 1.90 02/26/2022    EOSABS 0.04 02/26/2022    BASOSABS 0.05 02/26/2022     CMP:    Lab Results   Component Value Date     02/24/2022    K 4.2 02/24/2022     02/24/2022    CO2 25 02/24/2022    BUN 28 02/24/2022    CREATININE 1.1 02/24/2022    GFRAA >60 02/24/2022    LABGLOM >60 02/24/2022    PROT 7.9 02/22/2022    LABALBU 3.8 02/22/2022    CALCIUM 9.0 02/24/2022    BILITOT 0.4 02/22/2022    ALKPHOS 121 02/22/2022    AST 22 02/22/2022    ALT 30 02/22/2022     PT/INR:  No results found for: PROTIME, INR    Assessment:     Principal Problem:    Cellulitis left leg poss.  Abscess   Leukocytosis due to above  Mycotic toe nails   DM  HTN      Plan:   Continue same home per ID

## 2022-03-01 NOTE — PROGRESS NOTES
Infectious Disease  Progress Note  [facility]    Chief Complaint: left LE cellulitis    Subjective:  He is doing well. His wife was at bed side. No fever. Tolerating abx well.awaiting to go home. Scheduled Meds:   lisinopril  10 mg Oral Daily    amLODIPine  5 mg Oral Daily    metFORMIN  1,000 mg Oral BID WC    atorvastatin  40 mg Oral Daily    ceFAZolin  2,000 mg IntraVENous Q8H    insulin glargine  50 Units SubCUTAneous Daily    And    insulin lispro  9 Units SubCUTAneous TID WC    sodium chloride flush  5-40 mL IntraVENous 2 times per day    insulin lispro  0-12 Units SubCUTAneous TID WC    insulin lispro  0-6 Units SubCUTAneous Nightly     Continuous Infusions:   dextrose      sodium chloride       PRN Meds:HYDROcodone-acetaminophen, glucose, dextrose, glucagon (rDNA), dextrose, sodium chloride flush, sodium chloride    ROS:  As mentioned in subjective, all other systems negative      BP (!) 148/83   Pulse 77   Temp 97.5 °F (36.4 °C) (Oral)   Resp 20   Ht 5' 10\" (1.778 m)   Wt 255 lb 11.7 oz (116 kg)   SpO2 100%   BMI 36.69 kg/m²     Physical Exam  Const/Neuro- unchanged, no signs of acute distress, Alert  ENMT- Within Normal Limits, Normocephalic, mucous membranes pink/moist, No thrush  Neck: Neck supple  Heart- Regular, Rate, Rhythm- no murmur appreciated. Lungs- clear to ascultation. Respirations even and nonlabored. Abdomen- Soft, bowel sounds positive, non tender  Musculo/Extremities-  Equal and symmetrical, left LE edema and redness and tenderness +: seems to be better compared to yesterday. Neurological- No focal motor or sensory loss. No confusion  Skin:  Warm and dry, free from rashes.       Labs, Cultures reviewed  Radiology reviewed    Microbiology:   Blood cx 2/22: Staph epi and Staph capitis    CT left leg:   Diffuse subcutaneous soft tissue edema throughout the left lower   extremity.  Rule out cellulitis.       2.  Questionable primitive compartmentalization of a small amount

## 2022-03-01 NOTE — PROGRESS NOTES
Nutrition Assessment     Type and Reason for Visit: Initial,RD Nutrition Re-Screen/LOS (rd re-screen negative)    Nutrition Assessment:  Pt assessed per LOS protocol. Chart reviewed. Pt currently eating ~75% of most meals (noted sporadic intake at times) and w/ no other significant nutritional issues noted at this time. Will follow-up per policy. Please consult if RD needed.     Electronically signed by Holly Go RD, LOU on 3/1/22 at 11:48 AM EST    Contact: ext [phone]

## 2022-03-02 NOTE — DISCHARGE SUMMARY
Discharge Summary    Date: 3/2/2022  Patient Name: Samantha Taylor YOB: 1945 Age: 67 y.o. Admit Date: 2/22/2022  Discharge Date: 3/1/2022  Discharge Condition: Stable    Admission Diagnosis  Hyperglycemia (R73.9); Left leg cellulitis (L03.116)     Discharge Diagnosis  Principal Problem: Left leg cellulitisResolved Problems: * No resolved hospital problems. [facility] Stay  Narrative of Hospital Course:  Admitted from ER with swelling redness pain left leg due to cellulitis started on IV ATB seen by ID slowly improved stabilized switch to oral ATB and discharged home     Consultants:  [place]mber BeachKent Reese TO INFECTIOUS DISEASES    Surgeries/procedures Performed:       Treatments:           Discharge Plan/Disposition:  Home    Hospital/Incidental Findings Requiring Follow Up:    Patient Instructions:    Diet: Regular Diet    Activity:Activity as Tolerated  For number of days (if applicable): Other Instructions:    Provider Follow-Up:   No follow-ups on file.      Significant Diagnostic Studies:    Recent Labs:  Admission on 02/22/2022, Discharged on 03/01/2022Ventricular Rate                            Date: 02/22/2022Value: 65          Ref range: BPM                Status: FinalAtrial Rate                                   Date: 02/22/2022Value: 65          Ref range: BPM                Status: FinalP-R Interval                                  Date: 02/22/2022Value: 176         Ref range: ms                 Status: FinalQRS Duration                                  Date: 02/22/2022Value: 104         Ref range: ms                 Status: FinalQ-T Interval                                  Date: 02/22/2022Value: 406         Ref range: ms                 Status: FinalQTc Calculation (Yuett)                      Date: 02/22/2022Value: 422         Ref range: ms                 Status: FinalP Axis                                        Date: 02/22/2022Value: 19          Ref range: degrees            Status: FinalR Axis                                        Date: 02/22/2022Value: -33         Ref range: degrees            Status: FinalT Axis                                        Date: 02/22/2022Value: 88          Ref range: degrees            Status: FinalTroponin, High Sensitivity                    Date: 02/22/2022Value: 35*         Ref range: 0 - 11 ng/L        Status: Final              Comment: High Sensitivity Troponin values cannot be compared withother Troponin methodologies. Patients with high levels of Biotin oral intake (i.e. >5 mg/day)may have falsely decreased Troponin levels. Samples collectedwithin 8 hours of biotin intake may require additional informationfor diagnosis. WBC                                           Date: 02/22/2022Value: 15.2*       Ref range: 4.5 - 11.5 E9/L    Status: FinalRBC                                           Date: 02/22/2022Value: 4.46        Ref range: 3.80 - 5.80 E12/L  Status: FinalHemoglobin                                    Date: 02/22/2022Value: 13.5        Ref range: 12.5 - 16.5 g/dL   Status: FinalHematocrit                                    Date: 02/22/2022Value: 41.6        Ref range: 37.0 - 54.0 %      Status: FinalMCV                                           Date: 02/22/2022Value: 93.3        Ref range: 80.0 - 99.9 fL     Status: [address]                                           Date: 02/22/2022Value: 30.3        Ref range: 26.0 - 35.0 pg     Status: [address]                                          Date: 02/22/2022Value: 32.5        Ref range: 32.0 - 34.5 %      Status: FinalRDW                                           Date: 02/22/2022Value: 12.3        Ref range: 11.5 - 15.0 fL     Status: FinalPlatelets                                     Date: 02/22/2022Value: 277         Ref range: 130 - 450 E9/L     Status: FinalMPV                                           Date: 02/22/2022Value: 9.8         Ref range: 7.0 - 12.0 fL Status: FinalNeutrophils %                                 Date: 02/22/2022Value: 81.7*       Ref range: 43.0 - 80.0 %      Status: FinalImmature Granulocytes %                       Date: 02/22/2022Value: 0.5         Ref range: 0.0 - 5.0 %        Status: FinalLymphocytes %                                 Date: 02/22/2022Value: 10.0*       Ref range: 20.0 - 42.0 %      Status: FinalMonocytes %                                   Date: 02/22/2022Value: 7.4         Ref range: 2.0 - 12.0 %       Status: FinalEosinophils %                                 Date: 02/22/2022Value: 0.1         Ref range: 0.0 - 6.0 %        Status: FinalBasophils %                                   Date: 02/22/2022Value: 0.3         Ref range: 0.0 - 2.0 %        Status: FinalNeutrophils Absolute                          Date: 02/22/2022Value: 12.46*      Ref range: 1.80 - 7.30 E9/L   Status: FinalImmature Granulocytes #                       Date: 02/22/2022Value: 0.07        Ref range: E9/L               Status: FinalLymphocytes Absolute                          Date: 02/22/2022Value: 1.53        Ref range: 1.50 - 4.00 E9/L   Status: FinalMonocytes Absolute                            Date: 02/22/2022Value: 1.13*       Ref range: 0.10 - 0.95 E9/L   Status: FinalEosinophils Absolute                          Date: 02/22/2022Value: 0.01*       Ref range: 0.05 - 0.50 E9/L   Status: FinalBasophils Absolute                            Date: 02/22/2022Value: 0.04        Ref range: 0.00 - 0.20 E9/L   Status: FinalSodium                                        Date: 02/22/2022Value: 134         Ref range: 132 - 146 mmol/L   Status: FinalPotassium                                     Date: 02/22/2022Value: 4.9         Ref range: 3.5 - 5.0 mmol/L   Status: FinalChloride                                      Date: 02/22/2022Value: 97*         Ref range: 98 - 107 mmol/L    Status: FinalCO2                                           Date: 02/22/2022Value: 26 Ref range: 22 - 29 mmol/L     Status: FinalAnion Gap                                     Date: 02/22/2022Value: 11          Ref range: 7 - 16 mmol/L      Status: FinalGlucose                                       Date: 02/22/2022Value: 373*        Ref range: 74 - 99 mg/dL      Status: FinalBUN                                           Date: 02/22/2022Value: 23          Ref range: 6 - 23 mg/dL       Status: FinalCREATININE                                    Date: 02/22/2022Value: 1.1         Ref range: 0.7 - 1.2 mg/dL    Status: FinalGFR Non-                      Date: 02/22/2022Value: >60         Ref range: >=60 mL/min/1.73   Status: Final              Comment: Chronic Kidney Disease: less than 60 ml/min/1.73 sq.m. Kidney Failure: less than 15 ml/min/1.73 sq. m. Results valid for patients 18 years and older. GFR                           Date: 02/22/2022Value: >60           Status: FinalCalcium                                       Date: 02/22/2022Value: 9.2         Ref range: 8.6 - 10.2 mg/dL   Status: FinalTotal Protein                                 Date: 02/22/2022Value: 7.9         Ref range: 6.4 - 8.3 g/dL     Status: FinalAlbumin                                       Date: 02/22/2022Value: 3.8         Ref range: 3.5 - 5.2 g/dL     Status: FinalTotal Bilirubin                               Date: 02/22/2022Value: 0.4         Ref range: 0.0 - 1.2 mg/dL    Status: FinalAlkaline Phosphatase                          Date: 02/22/2022Value: 121         Ref range: 40 - 129 U/L       Status: FinalALT                                           Date: 02/22/2022Value: 30          Ref range: 0 - 40 U/L         Status: FinalAST                                           Date: 02/22/2022Value: 22          Ref range: 0 - 39 U/L         Status: FinalPro-BNP                                       Date: 02/22/2022Value: 665*        Ref range: 0 - 450 pg/mL      Status: FinalLactic Acid Date: 02/22/2022Value: 1.3         Ref range: 0.5 - 2.2 mmol/L   Status: FinalOrganism                                      Date: 02/22/2022Value: Staphylococcus epidermidis                     Status: FinalCulture, Blood 2                              Date: 02/22/2022Value: Previously positive blood culture called                     Status: FinalOrganism                                      Date: 02/22/2022Value: Staphylococcus capitis                     Status: FinalMeter Glucose                                 Date: 02/23/2022Value: 348*        Ref range: 74 - 99 mg/dL      Status: FinalGlucose                                       Date: 02/23/2022Value: 463*        Ref range: 74 - 99 mg/dL      Status: FinalMeter Glucose                                 Date: 02/23/2022Value: >500*       Ref range: 74 - 99 mg/dL      Status: FinalMeter Glucose                                 Date: 02/23/2022Value: 445*        Ref range: 74 - 99 mg/dL      Status: FinalMeter Glucose                                 Date: 02/23/2022Value: 389*        Ref range: 74 - 99 mg/dL      Status: [address] [facility]                                           Date: 02/24/2022Value: 14.3*       Ref range: 4.5 - 11.5 E9/L    Status: FinalRBC                                           Date: 02/24/2022Value: 4.46        Ref range: 3.80 - 5.80 E12/L  Status: FinalHemoglobin                                    Date: 02/24/2022Value: 13.4        Ref range: 12.5 - 16.5 g/dL   Status: FinalHematocrit                                    Date: 02/24/2022Value: 40.9        Ref range: 37.0 - 54.0 %      Status: FinalMCV                                           Date: 02/24/2022Value: 91.7        Ref range: 80.0 - 99.9 fL     Status: [address]                                           Date: 02/24/2022Value: 30.0        Ref range: 26.0 - 35.0 pg     Status: [address]                                          Date: 02/24/2022Value: 32.8 Ref range: 32.0 - 34.5 %      Status: FinalRDW                                           Date: 02/24/2022Value: 12.4        Ref range: 11.5 - 15.0 fL     Status: FinalPlatelets                                     Date: 02/24/2022Value: 275         Ref range: 130 - 450 E9/L     Status: FinalMPV                                           Date: 02/24/2022Value: 9.9         Ref range: 7.0 - 12.0 fL      Status: FinalNeutrophils %                                 Date: 02/24/2022Value: 80.0        Ref range: 43.0 - 80.0 %      Status: FinalImmature Granulocytes %                       Date: 02/24/2022Value: 0.7         Ref range: 0.0 - 5.0 %        Status: FinalLymphocytes %                                 Date: 02/24/2022Value: 11.8*       Ref range: 20.0 - 42.0 %      Status: FinalMonocytes %                                   Date: 02/24/2022Value: 7.1         Ref range: 2.0 - 12.0 %       Status: FinalEosinophils %                                 Date: 02/24/2022Value: 0.1         Ref range: 0.0 - 6.0 %        Status: FinalBasophils %                                   Date: 02/24/2022Value: 0.3         Ref range: 0.0 - 2.0 %        Status: FinalNeutrophils Absolute                          Date: 02/24/2022Value: 11.42*      Ref range: 1.80 - 7.30 E9/L   Status: FinalImmature Granulocytes #                       Date: 02/24/2022Value: 0.10        Ref range: E9/L               Status: FinalLymphocytes Absolute                          Date: 02/24/2022Value: 1.69        Ref range: 1.50 - 4.00 E9/L   Status: FinalMonocytes Absolute                            Date: 02/24/2022Value: 1.02*       Ref range: 0.10 - 0.95 E9/L   Status: FinalEosinophils Absolute                          Date: 02/24/2022Value: 0.02*       Ref range: 0.05 - 0.50 E9/L   Status: FinalBasophils Absolute                            Date: 02/24/2022Value: 0.04        Ref range: 0.00 - 0.20 E9/L   Status: FinalSodium Date: 02/24/2022Value: 137         Ref range: 132 - 146 mmol/L   Status: FinalPotassium                                     Date: 02/24/2022Value: 4.2         Ref range: 3.5 - 5.0 mmol/L   Status: FinalChloride                                      Date: 02/24/2022Value: 100         Ref range: 98 - 107 mmol/L    Status: FinalCO2                                           Date: 02/24/2022Value: 25          Ref range: 22 - 29 mmol/L     Status: FinalAnion Gap                                     Date: 02/24/2022Value: 12          Ref range: 7 - 16 mmol/L      Status: FinalGlucose                                       Date: 02/24/2022Value: 163*        Ref range: 74 - 99 mg/dL      Status: FinalBUN                                           Date: 02/24/2022Value: 28*         Ref range: 6 - 23 mg/dL       Status: FinalCREATININE                                    Date: 02/24/2022Value: 1.1         Ref range: 0.7 - 1.2 mg/dL    Status: FinalGFR Non-                      Date: 02/24/2022Value: >60         Ref range: >=60 mL/min/1.73   Status: Final              Comment: Chronic Kidney Disease: less than 60 ml/min/1.73 sq.m. Kidney Failure: less than 15 ml/min/1.73 sq. m. Results valid for patients 18 years and older. GFR                           Date: 02/24/2022Value: >60           Status: FinalCalcium                                       Date: 02/24/2022Value: 9.0         Ref range: 8.6 - 10.2 mg/dL   Status: FinalMeter Glucose                                 Date: 02/23/2022Value: 217*        Ref range: 74 - 99 mg/dL      Status: FinalMeter Glucose                                 Date: 02/24/2022Value: 153*        Ref range: 74 - 99 mg/dL      Status: FinalBottle Type                                   Date: 02/22/2022Value: Aerobic       Status: FinalSource of Blood Culture                       Date: 02/22/2022Value: No site given                     Status: FinalOrder Number Date: 02/22/2022Value: [id number]     Status: Servando Keisha baumannii co*           Date: 02/22/2022Value: Not Detected                   Ref range: Not Detected       Status: FinalBacteroides fragilis by PCR                   Date: 02/22/2022Value: Not Detected                   Ref range: Not Detected       Status: FinalEnterobacteriaceae by PCR                     Date: 02/22/2022Value: Not Detected                   Ref range: Not Detected       Status: FinalEnterobacter cloacae complex by PCR           Date: 02/22/2022Value: Not Detected                   Ref range: Not Detected       Status: FinalEnterococcus faecalis by PCR                  Date: 02/22/2022Value: Not Detected                   Ref range: Not Detected       Status: FinalEnterococcus faecium by PCR                   Date: 02/22/2022Value: Not Detected                   Ref range: Not Detected       Status: FinalEscherichia coli by PCR                       Date: 02/22/2022Value: Not Detected                   Ref range: Not Detected       Status: FinalHaemophilus Influenzae by PCR                 Date: 02/22/2022Value: Not Detected                   Ref range: Not Detected       Status: FinalKlebsiella aerogenes by PCR                   Date: 02/22/2022Value: Not Detected                   Ref range: Not Detected       Status: FinalKlebsiella oxytoca by PCR                     Date: 02/22/2022Value: Not Detected                   Ref range: Not Detected       Status: FinalKlebsiella pneumoniae group by PCR            Date: 02/22/2022Value: Not Detected                   Ref range: Not Detected       Status: FinalListeria monocytogenes by PCR                 Date: 02/22/2022Value: Not Detected                   Ref range: Not Detected       Status: FinalNeisseria meningitidis by PCR                 Date: 02/22/2022Value: Not Detected                   Ref range: Not Detected       Status: FinalProteus species by PCR                        Date: 02/22/2022Value: Not Detected                   Ref range: Not Detected       Status: FinalPseudomonas aeruginosa by PCR                 Date: 02/22/2022Value: Not Detected                   Ref range: Not Detected       Status: FinalSalmonella species by PCR                     Date: 02/22/2022Value: Not Detected                   Ref range: Not Detected       Status: FinalStreptococcus agalactiae by PCR               Date: 02/22/2022Value: Not Detected                   Ref range: Not Detected       Status: FinalStaphylococcus aureus by PCR                  Date: 02/22/2022Value: Not Detected                   Ref range: Not Detected       Status: FinalStaphylococcus epidermidis by PCR             Date: 02/22/2022Value: DETECTED*   Ref range: Not Detected       Status: FinalStaphylococcus lugdunensis by PCR             Date: 02/22/2022Value: Not Detected                   Ref range: Not Detected       Status: FinalStaphylococcus species by PCR                 Date: 02/22/2022Value: DETECTED*   Ref range: Not Detected       Status: FinalSerratia marcescens by PCR                    Date: 02/22/2022Value: Not Detected                   Ref range: Not Detected       Status: FinalStreptococcus pneumoniae by PCR               Date: 02/22/2022Value: Not Detected                   Ref range: Not Detected       Status: FinalStreptococcus pyogenes  by PCR                Date: 02/22/2022Value: Not Detected                   Ref range: Not Detected       Status: FinalStreptococcus species by PCR                  Date: 02/22/2022Value: Not Detected                   Ref range: Not Detected       Status: FinalStenotrophomonas maltophilia by PCR           Date: 02/22/2022Value: Not Detected                   Ref range: Not Detected       Status: FinalCandida albicans by PCR                       Date: 02/22/2022Value: Not Detected                   Ref range: Not Detected       Status: Home [place] by PCR                          Date: 02/22/2022Value: Not Detected                   Ref range: Not Detected       Status: FinalCandida glabrata by PCR                       Date: 02/22/2022Value: Not Detected                   Ref range: Not Detected       Status: FinalCandida krusei by PCR                         Date: 02/22/2022Value: Not Detected                   Ref range: Not Detected       Status: FinalCandida parapsilosis by PCR                   Date: 02/22/2022Value: Not Detected                   Ref range: Not Detected       Status: FinalCandida tropicalis by PCR                     Date: 02/22/2022Value: Not Detected                   Ref range: Not Detected       Status: FinalCryptococcus neoformans/gattii by *           Date: 02/22/2022Value: Not Detected                   Ref range: Not Detected       Status: FinalMethicillin Resistance mecA/C  by *           Date: 02/22/2022Value: Not Detected                   Ref range: Not Detected       Status: FinalMeter Glucose                                 Date: 02/24/2022Value: 325*        Ref range: 74 - 99 mg/dL      Status: FinalMeter Glucose                                 Date: 02/24/2022Value: 190*        Ref range: 74 - 99 mg/dL      Status: FinalMeter Glucose                                 Date: 02/24/2022Value: 283*        Ref range: 74 - 99 mg/dL      Status: FinalMeter Glucose                                 Date: 02/25/2022Value: 208*        Ref range: 74 - 99 mg/dL      Status: FinalMeter Glucose                                 Date: 02/25/2022Value: 319*        Ref range: 74 - 99 mg/dL      Status: FinalMeter Glucose                                 Date: 02/25/2022Value: 194*        Ref range: 74 - 99 mg/dL      Status: [address] [facility]                                           Date: 02/26/2022Value: 13.8*       Ref range: 4.5 - 11.5 E9/L    Status: FinalRB                                           Date: 02/26/2022Value: 4.39 Ref range: 3.80 - 5.80 E12/L  Status: FinalHemoglobin                                    Date: 02/26/2022Value: 13.3        Ref range: 12.5 - 16.5 g/dL   Status: FinalHematocrit                                    Date: 02/26/2022Value: 41.8        Ref range: 37.0 - 54.0 %      Status: FinalMCV                                           Date: 02/26/2022Value: 95.2        Ref range: 80.0 - 99.9 fL     Status: [address] [place]                                           Date: 02/26/2022Value: 30.3        Ref range: 26.0 - 35.0 pg     Status: [address]                                          Date: 02/26/2022Value: 31.8*       Ref range: 32.0 - 34.5 %      Status: FinalRDW                                           Date: 02/26/2022Value: 12.5        Ref range: 11.5 - 15.0 fL     Status: FinalPlatelets                                     Date: 02/26/2022Value: 217         Ref range: 130 - 450 E9/L     Status: FinalMPV                                           Date: 02/26/2022Value: 10.3        Ref range: 7.0 - 12.0 fL      Status: FinalNeutrophils %                                 Date: 02/26/2022Value: 76.1        Ref range: 43.0 - 80.0 %      Status: FinalImmature Granulocytes %                       Date: 02/26/2022Value: 0.8         Ref range: 0.0 - 5.0 %        Status: FinalLymphocytes %                                 Date: 02/26/2022Value: 13.8*       Ref range: 20.0 - 42.0 %      Status: FinalMonocytes %                                   Date: 02/26/2022Value: 8.6         Ref range: 2.0 - 12.0 %       Status: FinalEosinophils %                                 Date: 02/26/2022Value: 0.3         Ref range: 0.0 - 6.0 %        Status: FinalBasophils %                                   Date: 02/26/2022Value: 0.4         Ref range: 0.0 - 2.0 %        Status: FinalNeutrophils Absolute                          Date: 02/26/2022Value: 10.52*      Ref range: 1.80 - 7.30 E9/L   Status: FinalImmature Granulocytes # Date: 02/26/2022Value: 0.11        Ref range: E9/L               Status: FinalLymphocytes Absolute                          Date: 02/26/2022Value: 1.90        Ref range: 1.50 - 4.00 E9/L   Status: FinalMonocytes Absolute                            Date: 02/26/2022Value: 1.18*       Ref range: 0.10 - 0.95 E9/L   Status: FinalEosinophils Absolute                          Date: 02/26/2022Value: 0.04*       Ref range: 0.05 - 0.50 E9/L   Status: FinalBasophils Absolute                            Date: 02/26/2022Value: 0.05        Ref range: 0.00 - 0.20 E9/L   Status: FinalMeter Glucose                                 Date: 02/25/2022Value: 137*        Ref range: 74 - 99 mg/dL      Status: FinalMeter Glucose                                 Date: 02/26/2022Value: 189*        Ref range: 74 - 99 mg/dL      Status: FinalMeter Glucose                                 Date: 02/26/2022Value: 407*        Ref range: 74 - 99 mg/dL      Status: FinalMeter Glucose                                 Date: 02/26/2022Value: 203*        Ref range: 74 - 99 mg/dL      Status: FinalMeter Glucose                                 Date: 02/26/2022Value: 162*        Ref range: 74 - 99 mg/dL      Status: FinalMeter Glucose                                 Date: 02/27/2022Value: 310*        Ref range: 74 - 99 mg/dL      Status: FinalMeter Glucose                                 Date: 02/27/2022Value: 163*        Ref range: 74 - 99 mg/dL      Status: FinalMeter Glucose                                 Date: 02/27/2022Value: 109*        Ref range: 74 - 99 mg/dL      Status: FinalMeter Glucose                                 Date: 02/28/2022Value: 116*        Ref range: 74 - 99 mg/dL      Status: FinalMeter Glucose                                 Date: 02/28/2022Value: 270*        Ref range: 74 - 99 mg/dL      Status: FinalMeter Glucose                                 Date: 02/28/2022Value: 142*        Ref range: 74 - 99 mg/dL      Status: FinalMeter Glucose                                 Date: 02/28/2022Value: 149*        Ref range: 74 - 99 mg/dL      Status: FinalMeter Glucose                                 Date: 03/01/2022Value: 116*        Ref range: 74 - 99 mg/dL      Status: FinalMeter Glucose                                 Date: 03/01/2022Value: 183*        Ref range: 74 - 99 mg/dL      Status: Final------------    Radiology last 7 days:  CT TIBIA FIBULA LEFT WO CONTRASTResult Date: 2/27/20221. Diffuse subcutaneous soft tissue edema throughout the left lower extremity. Rule out cellulitis. 2.  Questionable primitive compartmentalization of a small amount of fluid in the posterolateral aspect of the ankle within the subcutaneous soft tissues. No further evaluation can be made without IV contrast.  A poorly formed fluid collection/abscess cannot be entirely excluded, however, it may just very well represent part of the generalized soft tissue swelling. CT scan or MRI of the left lower extremity with IV contrast can be performed for further evaluation. 3.  Diffuse calcification involving the SHANE, PTA, and peroneal arteries. Stenosis cannot be excluded. CTA of the left lower extremity can be considered, if clinically warranted. 4.  Osteo-degenerative changes and calcaneal bone spurs, as described above.       [unfilled]    Discharge Medications    Discharge Medication List as of 3/1/2022  2:19 PMSTART taking these medicationscephALEXin (KEFLEX) 500 MG capsuleTake 1 capsule by mouth 4 times daily for 7 days, Disp-28 capsule, R-0Printlisinopril (PRINIVIL;ZESTRIL) 10 MG tabletTake 1 tablet by mouth daily, Disp-30 tablet, R-3Normal    Discharge Medication List as of 3/1/2022  2:19 PM    Discharge Medication List as of 3/1/2022  2:19 PMCONTINUE these medications which have NOT CHANGEDamLODIPine (NORVASC) 5 MG tabletTake 1 tablet by mouth daily, Disp-90 tablet, R-1PrintmetFORMIN (GLUCOPHAGE) 1000 MG tabletTake 1 tablet by mouth 2 times daily (with meals), Disp-180 tablet, R-1Printnystatin (MYCOSTATIN) 451785 UNIT/GM creamApply topically 2 times daily Apply topically 2 times daily. , Topical, 2 TIMES DAILY Starting Wed 10/27/2021, Disp-30 g, R-2, Printsimvastatin (ZOCOR) 40 MG tabletTake 1 tablet by mouth nightly, Disp-90 tablet, R-1Printinsulin aspart protamine-insulin aspart (NOVOLOG MIX 70/30) (70-30) 100 UNIT/ML injectionInject 45 Units into the skin 2 times daily (with meals), Disp-3 vial,R-1Print    Discharge Medication List as of 3/1/2022  2:19 PMSTOP taking these medicationsHYDROcodone-acetaminophen ([address]) 5-325 MG per tabletComments:Reason for Stopping:Lancets MISCComments:Reason for Stopping:blood glucose monitor stripsComments:Reason for Stopping:    Time Spent on Discharge:3E] minutes were spent in patient examination, evaluation, counseling as well as medication reconciliation, prescriptions for required medications, discharge plan, and follow up.     Electronically signed by Sterling Sanders MD on 3/2/22 at 7:38 AM EST

## 2022-03-05 NOTE — PROGRESS NOTES
Physician Progress Note      PATIENT:               Minnie Marquez  CSN #:                  [id number]  :                       1945  ADMIT DATE:       2022 9:39 PM  [address] DATE:        3/1/2022 5:33 PM  RESPONDING  PROVIDER #:        Mraty King MD          QUERY TEXT:    Pt admitted with left leg cellulitis. Pt noted to have DM type 2 If possible,   please document in progress notes and discharge summary the relationship, if   any, between cellulitis and DM. The medical record reflects the following:  Risk Factors: LLE cellulitis, T2DM  Clinical Indicators:  ID consult-  Left LE non purulent cellulitis: on   cefazolin. Per patient it does not seem any better. No fever, leukocytosis is   getting better. Contamination of blood cx: one is Staph epidermidis, one is   staph capitis. They are contaminants. Pt on metformin and insulin  Treatment: consults, monitoring, IV abx, labs, ace wrap, elevate    Thank you,  Brandee Galicia RN  Clinical   [phone]  Options provided:  -- Left leg cellulitis associated with Diabetes  -- Left leg cellulitis unrelated to Diabetes  -- Other - I will add my own diagnosis  -- Disagree - Not applicable / Not valid  -- Disagree - Clinically unable to determine / Unknown  -- Refer to Clinical Documentation Reviewer    PROVIDER RESPONSE TEXT:    Left leg cellulitis unrelated to Diabetes.     Query created by: Meghan Bowen on 3/4/2022 8:02 AM      Electronically signed by:  Marty King MD 3/5/2022 12:05 PM

## 2022-04-13 PROBLEM — L03.116 LEFT LEG CELLULITIS: Status: RESOLVED | Noted: 2022-02-22 | Resolved: 2022-04-13

## 2022-04-29 PROBLEM — I10 ESSENTIAL HYPERTENSION: Status: ACTIVE | Noted: 2022-04-29

## 2022-12-28 PROBLEM — G89.29 CHRONIC BILATERAL LOW BACK PAIN WITHOUT SCIATICA: Status: ACTIVE | Noted: 2022-12-28

## 2022-12-28 PROBLEM — M54.50 CHRONIC BILATERAL LOW BACK PAIN WITHOUT SCIATICA: Status: ACTIVE | Noted: 2022-12-28

## 2023-01-27 NOTE — PROGRESS NOTES
Admit Date: 2/22/2022    Subjective:     Doing fine Bp still up BS better     Objective:     Patient Vitals for the past 8 hrs:   BP Temp Temp src Pulse Resp   02/24/22 0745 (!) 172/73 98.6 °F (37 °C) Oral 65 14     I/O last 3 completed shifts: In: 1140 [P.O.:1120; I.V.:20]  Out: 850 [Urine:850]  I/O this shift:  In: 10 [I.V.:10]  Out: -     HEENT: Normal  NECK: Thyroid normal. No carotid bruit. No lymphphadenopathy. CVS: RRR  RS: Clear. No wheeze. No rhonchi. Good airflow bilaterally. ABD: Soft. Non tender. No mass. Normal BS. EXT: trace  edema. Non tender.  Less erythema   NEURO: Intact      Scheduled Meds:   amLODIPine  5 mg Oral Daily    metFORMIN  1,000 mg Oral BID WC    atorvastatin  40 mg Oral Daily    ceFAZolin  2,000 mg IntraVENous Q8H    fluconazole  200 mg Oral Daily    insulin glargine  50 Units SubCUTAneous Daily    And    insulin lispro  9 Units SubCUTAneous TID WC    sodium chloride flush  5-40 mL IntraVENous 2 times per day    insulin lispro  0-12 Units SubCUTAneous TID WC    insulin lispro  0-6 Units SubCUTAneous Nightly     Continuous Infusions:   dextrose      sodium chloride         CBC with Differential:    Lab Results   Component Value Date    WBC 14.3 02/24/2022    RBC 4.46 02/24/2022    HGB 13.4 02/24/2022    HCT 40.9 02/24/2022     02/24/2022    MCV 91.7 02/24/2022    MCH 30.0 02/24/2022    MCHC 32.8 02/24/2022    RDW 12.4 02/24/2022    LYMPHOPCT 11.8 02/24/2022    MONOPCT 7.1 02/24/2022    BASOPCT 0.3 02/24/2022    MONOSABS 1.02 02/24/2022    LYMPHSABS 1.69 02/24/2022    EOSABS 0.02 02/24/2022    BASOSABS 0.04 02/24/2022     CMP:    Lab Results   Component Value Date     02/24/2022    K 4.2 02/24/2022     02/24/2022    CO2 25 02/24/2022    BUN 28 02/24/2022    CREATININE 1.1 02/24/2022    GFRAA >60 02/24/2022    LABGLOM >60 02/24/2022    PROT 7.9 02/22/2022    LABALBU 3.8 02/22/2022    CALCIUM 9.0 02/24/2022    BILITOT 0.4 02/22/2022    ALKPHOS 121 Communication with patient PCP that patient would benefit from a sooner appointment than the wait list. Please call patient and see if an urgent new patient spot on my schedule will work for her.      Thank you   02/22/2022    AST 22 02/22/2022    ALT 30 02/22/2022     PT/INR:  No results found for: PROTIME, INR    Assessment:     Principal Problem:  Cellulitis left leg   Leukocytosis due to above  Mycotic toe nails   DM  HTN      Plan:   Continue ATB add ACE monitor

## 2023-11-05 NOTE — PLAN OF CARE
Problem: Pain:  Goal: Pain level will decrease  Description: Pain level will decrease  Outcome: Met This Shift  Goal: Control of acute pain  Description: Control of acute pain  Outcome: Met This Shift  Goal: Control of chronic pain  Description: Control of chronic pain  Outcome: Met This Shift 22-year-old male with past medical history SVT presents for palpitations lasting 2 hours, self resolved.  Initial EKG is sinus tach.  On my exam patient in NSR at 60.  Will obtain repeat EKG, no work-up indicated at this time.  Encouraged patient and mom to follow-up with cardiology for event monitoring.

## 2024-01-12 PROBLEM — M48.02 CERVICAL STENOSIS OF SPINAL CANAL: Status: ACTIVE | Noted: 2024-01-12

## 2024-01-12 PROBLEM — R27.0 ATAXIA: Status: ACTIVE | Noted: 2024-01-12

## 2024-01-24 ENCOUNTER — HOSPITAL ENCOUNTER (OUTPATIENT)
Dept: MRI IMAGING | Age: 79
Discharge: HOME | End: 2024-01-26
Payer: MEDICARE

## 2024-01-24 DIAGNOSIS — M48.02 CERVICAL STENOSIS OF SPINAL CANAL: ICD-10-CM

## 2024-01-24 DIAGNOSIS — R27.0 ATAXIA: ICD-10-CM

## 2024-01-24 PROCEDURE — 72141 MRI NECK SPINE W/O DYE: CPT

## 2024-01-24 PROCEDURE — 70551 MRI BRAIN STEM W/O DYE: CPT

## 2024-01-25 NOTE — RESULT ENCOUNTER NOTE
SPOKE WITH NATIVIDAD FILIBERTO REGARDING ALL RECOMMENDATIONS, MEDICATION CALLED IN REFERRALS GIVEN TO YUE

## 2024-02-07 ENCOUNTER — HOSPITAL ENCOUNTER (OUTPATIENT)
Age: 79
Discharge: HOME | End: 2024-02-09
Payer: MEDICARE

## 2024-02-07 ENCOUNTER — TRANSCRIBE ORDERS (OUTPATIENT)
Age: 79
End: 2024-02-07

## 2024-02-07 ENCOUNTER — HOSPITAL ENCOUNTER (OUTPATIENT)
Dept: INTERVENTIONAL RADIOLOGY/VASCULAR | Age: 79
Discharge: HOME | End: 2024-02-09
Payer: MEDICARE

## 2024-02-07 VITALS
HEIGHT: 68 IN | DIASTOLIC BLOOD PRESSURE: 80 MMHG | SYSTOLIC BLOOD PRESSURE: 142 MMHG | WEIGHT: 258 LBS | BODY MASS INDEX: 39.1 KG/M2

## 2024-02-07 VITALS
DIASTOLIC BLOOD PRESSURE: 80 MMHG | WEIGHT: 258 LBS | SYSTOLIC BLOOD PRESSURE: 142 MMHG | BODY MASS INDEX: 39.1 KG/M2 | HEIGHT: 68 IN

## 2024-02-07 DIAGNOSIS — I63.9 CEREBROVASCULAR ACCIDENT (CVA), UNSPECIFIED MECHANISM (HCC): ICD-10-CM

## 2024-02-07 DIAGNOSIS — R47.01 COMBINED RECEPTIVE AND EXPRESSIVE APHASIA DUE TO ACUTE CEREBROVASCULAR ACCIDENT (CVA) (HCC): ICD-10-CM

## 2024-02-07 DIAGNOSIS — I10 ESSENTIAL HYPERTENSION: ICD-10-CM

## 2024-02-07 DIAGNOSIS — I63.9 COMBINED RECEPTIVE AND EXPRESSIVE APHASIA DUE TO ACUTE CEREBROVASCULAR ACCIDENT (CVA) (HCC): ICD-10-CM

## 2024-02-07 DIAGNOSIS — I10 ESSENTIAL HYPERTENSION, MALIGNANT: ICD-10-CM

## 2024-02-07 DIAGNOSIS — I10 ESSENTIAL HYPERTENSION, MALIGNANT: Primary | ICD-10-CM

## 2024-02-07 PROCEDURE — 93880 EXTRACRANIAL BILAT STUDY: CPT

## 2024-02-07 PROCEDURE — 93306 TTE W/DOPPLER COMPLETE: CPT

## 2024-02-08 LAB — ECHO BSA: 2.37 M2

## 2024-07-24 ENCOUNTER — TELEPHONE (OUTPATIENT)
Dept: VASCULAR SURGERY | Age: 79
End: 2024-07-24

## 2024-07-24 NOTE — TELEPHONE ENCOUNTER
Received referral from SABRINA Ang regarding venous insufficiency, leg edema, left message for patient to return call to schedule.

## 2024-07-29 ENCOUNTER — TELEPHONE (OUTPATIENT)
Dept: VASCULAR SURGERY | Age: 79
End: 2024-07-29

## 2024-07-29 NOTE — TELEPHONE ENCOUNTER
Second Attempt:  Received referral from SABRINA Ang NP regarding venous insufficieny, leg edema, left message for patient to return call to schedule.

## 2024-07-31 ENCOUNTER — TELEPHONE (OUTPATIENT)
Dept: VASCULAR SURGERY | Age: 79
End: 2024-07-31

## 2024-07-31 NOTE — TELEPHONE ENCOUNTER
Third Attempt:  Received referral from JCAE Ang NP regarding venous insufficiency and leg edema, left message for patient to return call to schedule.  Will send back to Ana Lilia Ang NP

## 2024-08-08 ENCOUNTER — OFFICE VISIT (OUTPATIENT)
Dept: VASCULAR SURGERY | Age: 79
End: 2024-08-08
Payer: MEDICARE

## 2024-08-08 ENCOUNTER — TELEPHONE (OUTPATIENT)
Dept: VASCULAR SURGERY | Age: 79
End: 2024-08-08

## 2024-08-08 VITALS — WEIGHT: 265 LBS | BODY MASS INDEX: 40.29 KG/M2

## 2024-08-08 DIAGNOSIS — M79.89 LEG SWELLING: ICD-10-CM

## 2024-08-08 DIAGNOSIS — I89.0 LYMPHEDEMA OF BOTH LOWER EXTREMITIES: Primary | ICD-10-CM

## 2024-08-08 DIAGNOSIS — R09.89 DECREASED DORSALIS PEDIS PULSE: ICD-10-CM

## 2024-08-08 PROCEDURE — 1124F ACP DISCUSS-NO DSCNMKR DOCD: CPT | Performed by: SURGERY

## 2024-08-08 PROCEDURE — 99204 OFFICE O/P NEW MOD 45 MIN: CPT | Performed by: SURGERY

## 2024-08-08 RX ORDER — IBUPROFEN 200 MG
200 TABLET ORAL EVERY 6 HOURS PRN
COMMUNITY

## 2024-08-08 RX ORDER — GLIMEPIRIDE 1 MG/1
1 TABLET ORAL
COMMUNITY

## 2024-08-08 NOTE — TELEPHONE ENCOUNTER
I left a message with Asim informing him of his vascular ankle index (AUGUSTA) and Vascular duplex lower extremity venous bilateral test  scheduled at [facility] on August 30,2024 at 10:30 pm. He has to arrive at 10:00 am. He is to bring his insurance cards, Photo Id., and list of medications.

## 2024-08-08 NOTE — PROGRESS NOTES
symptoms like loss of speech, vision or loss of function of extremity.      All the questions were answered.      Orders Placed This Encounter   Procedures    Vascular ankle brachial index (AUGUSTA)    Vascular duplex lower extremity venous bilateral             Indicated follow-up: Return if symptoms worsen or fail to improve.        CC to ABBEY Olivera-CNP

## 2024-08-30 ENCOUNTER — HOSPITAL ENCOUNTER (OUTPATIENT)
Dept: INTERVENTIONAL RADIOLOGY/VASCULAR | Age: 79
End: 2024-08-30
Attending: SURGERY
Payer: MEDICARE

## 2024-08-30 ENCOUNTER — HOSPITAL ENCOUNTER (OUTPATIENT)
Dept: ULTRASOUND IMAGING | Age: 79
Discharge: HOME | End: 2024-08-30
Attending: SURGERY
Payer: MEDICARE

## 2024-08-30 DIAGNOSIS — M79.89 LEG SWELLING: ICD-10-CM

## 2024-08-30 DIAGNOSIS — I89.0 LYMPHEDEMA OF BOTH LOWER EXTREMITIES: ICD-10-CM

## 2024-08-30 DIAGNOSIS — R09.89 DECREASED DORSALIS PEDIS PULSE: ICD-10-CM

## 2024-08-30 PROCEDURE — 93922 UPR/L XTREMITY ART 2 LEVELS: CPT | Performed by: SURGERY

## 2024-08-30 PROCEDURE — 93922 UPR/L XTREMITY ART 2 LEVELS: CPT

## 2024-08-30 PROCEDURE — 93970 EXTREMITY STUDY: CPT

## 2024-09-03 ENCOUNTER — CLINICAL DOCUMENTATION (OUTPATIENT)
Dept: VASCULAR SURGERY | Age: 79
End: 2024-09-03

## 2024-09-03 ENCOUNTER — TELEPHONE (OUTPATIENT)
Dept: VASCULAR SURGERY | Age: 79
End: 2024-09-03

## 2024-09-03 DIAGNOSIS — M79.89 LEG SWELLING: ICD-10-CM

## 2024-09-03 DIAGNOSIS — I89.0 LYMPHEDEMA OF BOTH LOWER EXTREMITIES: Primary | ICD-10-CM

## 2024-09-03 NOTE — PROGRESS NOTES
The patient's vascular ultrasound testings were reviewed as outlined below    1.Bilaterally, the ankle-brachial index is falsely elevated due to calcification  tibial arteries, but patient does have  triphasic ankle Doppler tracings and  good arterial flow to both feet based upon the pulse volume recordings over the  metatarsals and the toes    2.  Venous ultrasound, both legs, no DVT    Left message for the patient, regarding the above, recommended him lymphedema therapy and once maximum improvement is noted, have the legs measured for compression device and to call the office if any questions or concerns

## 2024-09-03 NOTE — TELEPHONE ENCOUNTER
Notified patient's wife that the test results were normal.recommended lymphedema therapy. Faxed order to [facility]. [phone]

## 2024-11-06 ENCOUNTER — APPOINTMENT (OUTPATIENT)
Dept: GENERAL RADIOLOGY | Age: 79
End: 2024-11-06
Payer: MEDICARE

## 2024-11-06 ENCOUNTER — APPOINTMENT (OUTPATIENT)
Dept: CT IMAGING | Age: 79
End: 2024-11-06
Payer: MEDICARE

## 2024-11-06 ENCOUNTER — APPOINTMENT (OUTPATIENT)
Dept: CT IMAGING | Age: 79
End: 2024-11-06
Attending: EMERGENCY MEDICINE
Payer: MEDICARE

## 2024-11-06 ENCOUNTER — HOSPITAL ENCOUNTER (EMERGENCY)
Age: 79
Discharge: HOME | End: 2024-11-06
Attending: EMERGENCY MEDICINE
Payer: MEDICARE

## 2024-11-06 VITALS
TEMPERATURE: 97.8 F | DIASTOLIC BLOOD PRESSURE: 109 MMHG | SYSTOLIC BLOOD PRESSURE: 189 MMHG | RESPIRATION RATE: 18 BRPM | HEIGHT: 70 IN | WEIGHT: 265 LBS | BODY MASS INDEX: 37.94 KG/M2 | HEART RATE: 64 BPM | OXYGEN SATURATION: 96 %

## 2024-11-06 DIAGNOSIS — M25.512 ACUTE PAIN OF BOTH SHOULDERS: Primary | ICD-10-CM

## 2024-11-06 DIAGNOSIS — M54.2 NECK PAIN: ICD-10-CM

## 2024-11-06 DIAGNOSIS — M25.511 ACUTE PAIN OF BOTH SHOULDERS: Primary | ICD-10-CM

## 2024-11-06 LAB
ANION GAP SERPL CALCULATED.3IONS-SCNC: 11 MMOL/L (ref 7–16)
BASOPHILS # BLD: 0.03 K/UL (ref 0–0.2)
BASOPHILS NFR BLD: 0 % (ref 0–2)
BUN SERPL-MCNC: 26 MG/DL (ref 6–23)
CALCIUM SERPL-MCNC: 9.1 MG/DL (ref 8.6–10.2)
CHLORIDE SERPL-SCNC: 103 MMOL/L (ref 98–107)
CO2 SERPL-SCNC: 27 MMOL/L (ref 22–29)
CREAT SERPL-MCNC: 1.2 MG/DL (ref 0.7–1.2)
EOSINOPHIL # BLD: 0.15 K/UL (ref 0.05–0.5)
EOSINOPHILS RELATIVE PERCENT: 2 % (ref 0–6)
ERYTHROCYTE [DISTWIDTH] IN BLOOD BY AUTOMATED COUNT: 13.2 % (ref 11.5–15)
GFR, ESTIMATED: 59 ML/MIN/1.73M2
GLUCOSE BLD-MCNC: 166 MG/DL (ref 74–99)
GLUCOSE SERPL-MCNC: 176 MG/DL (ref 74–99)
HCT VFR BLD AUTO: 44.3 % (ref 37–54)
HGB BLD-MCNC: 14.8 G/DL (ref 12.5–16.5)
IMM GRANULOCYTES # BLD AUTO: 0.05 K/UL (ref 0–0.58)
IMM GRANULOCYTES NFR BLD: 1 % (ref 0–5)
INR PPP: 1
LYMPHOCYTES NFR BLD: 2.59 K/UL (ref 1.5–4)
LYMPHOCYTES RELATIVE PERCENT: 27 % (ref 20–42)
MAGNESIUM SERPL-MCNC: 2.4 MG/DL (ref 1.6–2.6)
MCH RBC QN AUTO: 30.7 PG (ref 26–35)
MCHC RBC AUTO-ENTMCNC: 33.4 G/DL (ref 32–34.5)
MCV RBC AUTO: 91.9 FL (ref 80–99.9)
MONOCYTES NFR BLD: 0.81 K/UL (ref 0.1–0.95)
MONOCYTES NFR BLD: 8 % (ref 2–12)
NEUTROPHILS NFR BLD: 63 % (ref 43–80)
NEUTS SEG NFR BLD: 6.14 K/UL (ref 1.8–7.3)
PLATELET # BLD AUTO: 223 K/UL (ref 130–450)
PMV BLD AUTO: 10.2 FL (ref 7–12)
POTASSIUM SERPL-SCNC: 5 MMOL/L (ref 3.5–5)
PROTHROMBIN TIME: 10.9 SEC (ref 9.3–12.4)
RBC # BLD AUTO: 4.82 M/UL (ref 3.8–5.8)
SODIUM SERPL-SCNC: 141 MMOL/L (ref 132–146)
WBC OTHER # BLD: 9.8 K/UL (ref 4.5–11.5)

## 2024-11-06 PROCEDURE — 80048 BASIC METABOLIC PNL TOTAL CA: CPT

## 2024-11-06 PROCEDURE — 82962 GLUCOSE BLOOD TEST: CPT

## 2024-11-06 PROCEDURE — 85610 PROTHROMBIN TIME: CPT

## 2024-11-06 PROCEDURE — 99284 EMERGENCY DEPT VISIT MOD MDM: CPT

## 2024-11-06 PROCEDURE — 83735 ASSAY OF MAGNESIUM: CPT

## 2024-11-06 PROCEDURE — 73030 X-RAY EXAM OF SHOULDER: CPT

## 2024-11-06 PROCEDURE — 36415 COLL VENOUS BLD VENIPUNCTURE: CPT

## 2024-11-06 PROCEDURE — 72050 X-RAY EXAM NECK SPINE 4/5VWS: CPT

## 2024-11-06 PROCEDURE — 96375 TX/PRO/DX INJ NEW DRUG ADDON: CPT

## 2024-11-06 PROCEDURE — 71045 X-RAY EXAM CHEST 1 VIEW: CPT

## 2024-11-06 PROCEDURE — 70450 CT HEAD/BRAIN W/O DYE: CPT

## 2024-11-06 PROCEDURE — 85025 COMPLETE CBC W/AUTO DIFF WBC: CPT

## 2024-11-06 PROCEDURE — 6370000000 HC RX 637 (ALT 250 FOR IP): Performed by: EMERGENCY MEDICINE

## 2024-11-06 PROCEDURE — 72125 CT NECK SPINE W/O DYE: CPT

## 2024-11-06 PROCEDURE — 96374 THER/PROPH/DIAG INJ IV PUSH: CPT

## 2024-11-06 PROCEDURE — 6360000002 HC RX W HCPCS: Performed by: EMERGENCY MEDICINE

## 2024-11-06 RX ORDER — OXYCODONE AND ACETAMINOPHEN 5; 325 MG/1; MG/1
1 TABLET ORAL EVERY 6 HOURS PRN
Qty: 12 TABLET | Refills: 0 | Status: SHIPPED | OUTPATIENT
Start: 2024-11-06 | End: 2024-11-09

## 2024-11-06 RX ORDER — FENTANYL CITRATE 50 UG/ML
50 INJECTION, SOLUTION INTRAMUSCULAR; INTRAVENOUS ONCE
Status: COMPLETED | OUTPATIENT
Start: 2024-11-06 | End: 2024-11-06

## 2024-11-06 RX ORDER — ONDANSETRON 2 MG/ML
4 INJECTION INTRAMUSCULAR; INTRAVENOUS ONCE
Status: COMPLETED | OUTPATIENT
Start: 2024-11-06 | End: 2024-11-06

## 2024-11-06 RX ORDER — OXYCODONE AND ACETAMINOPHEN 5; 325 MG/1; MG/1
2 TABLET ORAL ONCE
Status: COMPLETED | OUTPATIENT
Start: 2024-11-06 | End: 2024-11-06

## 2024-11-06 RX ADMIN — ONDANSETRON 4 MG: 2 INJECTION INTRAMUSCULAR; INTRAVENOUS at 21:15

## 2024-11-06 RX ADMIN — OXYCODONE HYDROCHLORIDE AND ACETAMINOPHEN 2 TABLET: 5; 325 TABLET ORAL at 23:22

## 2024-11-06 RX ADMIN — FENTANYL CITRATE 50 MCG: 50 INJECTION INTRAMUSCULAR; INTRAVENOUS at 21:15

## 2024-11-06 ASSESSMENT — PAIN SCALES - GENERAL
PAINLEVEL_OUTOF10: 9
PAINLEVEL_OUTOF10: 8
PAINLEVEL_OUTOF10: 9

## 2024-11-06 ASSESSMENT — PAIN DESCRIPTION - DESCRIPTORS: DESCRIPTORS: STABBING

## 2024-11-06 ASSESSMENT — LIFESTYLE VARIABLES: HOW OFTEN DO YOU HAVE A DRINK CONTAINING ALCOHOL: NEVER

## 2024-11-06 ASSESSMENT — PAIN DESCRIPTION - LOCATION
LOCATION: SHOULDER
LOCATION: SHOULDER;NECK
LOCATION: NECK;SHOULDER

## 2024-11-06 ASSESSMENT — PAIN DESCRIPTION - ORIENTATION
ORIENTATION: LEFT
ORIENTATION: LEFT

## 2024-11-06 NOTE — ED NOTES
Department of Emergency Medicine  FIRST PROVIDER TRIAGE NOTE             Independent MLP           11/6/24  1:22 PM EST    Date of Encounter: 11/6/24   MRN: [id number]      HPI: Asim Person is a 78 y.o. male who presents to the ED for Shoulder Pain (Pt states he got dizzy and fell on Sunday. Left shoulder pain. Pt states he has been dizzy and off balance x 1 year. )   DID NOT GET KNOCKED OUT.  HE REMEMBERS THE FALL    ROS: Negative for cp or sob.    PE: Gen Appearance/Constitutional: alert  HEENT: NC/NT. PERRLA,  Airway patent.    Provider-Patient relationship only established for Provider In Triage (PIT)  Full assessment, HPI and examination not performed, therefore, it is not yet possible to state whether or not an emergency medical condition exists   Focused assessment only during triage. This is not a comprehensive evaluation due to no physical ER space, staff shortage and high numbers of boarding patients in the ER.       Initial Plan of Care: All treatment areas with department are currently occupied. Plan to order/Initiate the following while awaiting opening in ED.  Initiate Treatment-Testing, Proceed toTreatment Area When Bed Available for ED Attending/MLP to Continue Care    Electronically signed by ABBEY Cano CNP   DD: 11/6/24      Hosea Cooney APRN - CNP  11/06/24 1652

## 2024-11-06 NOTE — ED PROVIDER NOTES
HPI:  11/6/24,   Time: 2:19 PM GORDON Person is a 78 y.o. male presenting to the ED for fall/shoulder and  neck pain, beginning 1 hr ago.  The complaint has been persistent, mild in severity, and worsened by movement of ext.  Brought in by private vehicle.  Fall 3 days ago.  Continued bilateral shoulder pain and neck pain.  No numbness or tingling in extremities.  No focal weakness.  Patient is ambulatory.    Review of Systems:   Pertinent positives and negatives are stated within HPI, all other systems reviewed and are negative.          --------------------------------------------- PAST HISTORY ---------------------------------------------  Past Medical History:  has a past medical history of Decreased dorsalis pedis pulse, Diabetes mellitus (HCC), Dizziness, Dizziness, Hypertension, Hypertension, Inner ear dysfunction, Leg swelling, Lymphedema of both lower extremities, Prostate enlargement, and Syncope, near.    Past Surgical History:  has a past surgical history that includes Tonsillectomy; eye surgery; and Colonoscopy (08/06/2010).    Social History:  reports that he quit smoking about 10 years ago. His smoking use included pipe and cigarettes. He started smoking about 40 years ago. He has a 30.7 pack-year smoking history. He has never used smokeless tobacco. He reports that he does not drink alcohol and does not use drugs.    Family History: family history includes Cancer in his brother; High Blood Pressure in his father.     The patient’s home medications have been reviewed.    Allergies: Patient has no known allergies.        ---------------------------------------------------PHYSICAL EXAM--------------------------------------    Constitutional/General: Alert and oriented x3, well appearing, non toxic in NAD  Head: Normocephalic and atraumatic  Eyes: PERRL, EOMI, conjunctive normal, sclera non icteric  Mouth: Oropharynx clear, handling secretions, no trismus, no asymmetry of the posterior

## 2024-11-07 NOTE — CONSULTS
TRAUMA CONSULT NOTE  Attending/Surgical Resident/Advance Practice Nurse  11/6/2024  11:12 PM    PRIMARY SURVEY    CHIEF COMPLAINT:  Trauma consult.    Injury occurred just prior to arrival.  Patient reports falling at home on Sunday evening.  He was getting up out of bed to use the restroom when he lost his balance and fell onto his left side.  He has been having persistent left shoulder and left-sided neck pain since that time.  CT head was negative.  X-rays of the cervical spine demonstrated C4-C5 anterolisthesis.  CT of the cervical spine was negative for any acute fractures or malalignment.  Imaging of the left shoulder was negative for any acute fractures.  Chest x-ray also negative.  Patient is GCS 15.  He is complaining of left shoulder pain and left-sided neck pain.    Patient states that he is fallen multiple times over the past few months due to balance issues.  He seen his PCP for these issues who felt this was related to possible mini strokes and he has recently started working with physical therapy.    AIRWAY:   Airway Normal    EMS ETT Absent  Noisy respirations Absent  Retractions: Absent  Vomiting/bleeding: Absent    BREATHING:    Midaxillary breath sound left:  Normal  Midaxillary breath sound right:  Normal    Cough sound intensity:  good    FiO2:  Room air    SMI deferred    CIRCULATION:   Femerol pulse intensity: Strong  Palpebral conjunctiva: Red    Vitals:    11/06/24 2115   BP:    Pulse:    Resp: 18   Temp:    SpO2:        Vitals:    11/06/24 1315 11/06/24 1321 11/06/24 2040 11/06/24 2115   BP:  (!) 186/84 (!) 189/109    Pulse: 70  64    Resp:  19 17 18   Temp: 98 °F (36.7 °C)  97.8 °F (36.6 °C)    SpO2: 96%  96%    Weight:  120.2 kg (265 lb)     Height:  1.778 m (5' 10\")          FAST EXAM: Deferred    Central Nervous System    GCS Initial 15 minutes   Eye  Motor  Verbal 4 - Opens eyes on own  6 - Follows simple motor commands  5 - Alert and oriented 4 - Opens eyes on own  6 - Follows simple